# Patient Record
Sex: MALE | Race: OTHER | NOT HISPANIC OR LATINO | URBAN - METROPOLITAN AREA
[De-identification: names, ages, dates, MRNs, and addresses within clinical notes are randomized per-mention and may not be internally consistent; named-entity substitution may affect disease eponyms.]

---

## 2021-09-22 PROBLEM — E78.5 HYPERLIPIDEMIA, UNSPECIFIED: Chronic | Status: ACTIVE | Noted: 2020-02-28

## 2021-09-22 PROBLEM — I10 ESSENTIAL (PRIMARY) HYPERTENSION: Chronic | Status: ACTIVE | Noted: 2020-02-28

## 2021-09-23 VITALS
DIASTOLIC BLOOD PRESSURE: 79 MMHG | OXYGEN SATURATION: 98 % | HEART RATE: 78 BPM | RESPIRATION RATE: 18 BRPM | WEIGHT: 145.95 LBS | SYSTOLIC BLOOD PRESSURE: 147 MMHG | HEIGHT: 65 IN | TEMPERATURE: 98 F

## 2021-09-23 RX ORDER — CHLORHEXIDINE GLUCONATE 213 G/1000ML
1 SOLUTION TOPICAL ONCE
Refills: 0 | Status: DISCONTINUED | OUTPATIENT
Start: 2021-10-25 | End: 2021-11-09

## 2021-09-23 NOTE — H&P ADULT - ASSESSMENT
64 yo Turkish speaking Male, FHX CAD, PMHx HTN, HLD, Hep C s/p treatment, BPH who in light of pt's risk factors, CCS Class III anginal symptoms, and abnormal stress ECHO pt presents for recommended cardiac catheterization with possible intervention.    ASA II, Mallampati II    Hgb/HCT: 14.7/43.0. Pt denies bleeding, melena, BRBPR, hematuria. Pt reports compliance with Aspirin 81mg daily, last dose 10/24/21. Pt was loaded with Aspirin 243mg PO x 1 and Plavix 600mg PO x 1 prior to cath.   NS @ 75 cc/hr ordered. EF 65-70% by ECHO per MD note. Pt is euvolemic on exam. Cr. 1.20  K 3.7. KCl 40mEq PO x 1 ordered.     Pt is a candidate for moderate sedation: Yes    Pt was consented for procedure with Turkish  ID # [id number]  Risks & benefits of procedure and alternative therapy have been explained to the patient including but not limited to: allergic reaction, bleeding w/possible need for blood transfusion, infection, renal and vascular compromise, limb damage, arrhythmia, stroke, vessel dissection/perforation, Myocardial infarction, emergent CABG. Informed consent obtained and in chart.   66 yo Nepali speaking Male, FHX CAD, PMHx HTN, HLD, Hep C s/p treatment, BPH who in light of pt's risk factors, CCS Class III anginal symptoms, and abnormal stress ECHO pt presents for recommended cardiac catheterization with possible intervention.    ASA II, Mallampati II    Hgb/HCT: 14.7/43.0. Pt denies bleeding, melena, BRBPR, hematuria. Pt reports compliance with Aspirin 81mg daily, last dose 10/24/21. Pt was loaded with Aspirin 243mg PO x 1 and Plavix 600mg PO x 1 prior to cath.   NS @ 75 cc/hr ordered. EF 65-70% by ECHO per MD note. Pt is euvolemic on exam. Cr. 1.20  K 3.7. KCl 40mEq PO x 1 ordered.   - On Pravastatin 20mg at home.     Pt is a candidate for moderate sedation: Yes    Pt was consented for procedure with Nepali  ID # [id number]  Risks & benefits of procedure and alternative therapy have been explained to the patient including but not limited to: allergic reaction, bleeding w/possible need for blood transfusion, infection, renal and vascular compromise, limb damage, arrhythmia, stroke, vessel dissection/perforation, Myocardial infarction, emergent CABG. Informed consent obtained and in chart.

## 2021-09-23 NOTE — H&P ADULT - HISTORY OF PRESENT ILLNESS
[facility]    Cardiologist: Dr Rivero  Escort:  Pharmacy:  COVID:      66 yo Male, FHX CAD, PMHX Hyperlipidemia, Hep C presented to cardiologist, Dr Jessenia Rivero, endorsing left sided 5/10 nonradiating chest pain independent of activity, worsening over the last month. Denies.... According to MD note, Exercise NST 01/19 shows ischemia in mid-basal anterior walls. According to MD note, Echocardiogram showed EF 65-70%. Mild AI.     In light of pt's risk factors, CCS Class .... anginal symptoms, and abnormal Exercise NST pt presents for recommended cardiac catheterization with possible intervention.      Cardiologist: Dr Rivero  Escort:   Pharmacy:  COVID:      64 yo Male, FHX CAD, PMHX Hyperlipidemia, Hep C presented to cardiologist, Dr Jessenia Rivero, endorsing left sided 5/10 nonradiating chest pain independent of activity, worsening over the last month. Denies.... According to MD note, Exercise NST 01/19 shows ischemia in mid-basal anterior walls. According to MD note, Echocardiogram showed EF 65-70%. Mild AI.     In light of pt's risk factors, CCS Class .... anginal symptoms, and abnormal Exercise NST pt presents for recommended cardiac catheterization with possible intervention.      Cardiologist: Dr Rivero  Escort:   Pharmacy:  COVID:    *Please confirm meds on arrival  66 yo Male, FHX CAD, PMHX Hyperlipidemia, Hep C presented to cardiologist, Dr Jessenia Rivero, endorsing left sided 5/10 nonradiating chest pain independent of activity, worsening over the last month. Denies.... According to MD note, Exercise NST 01/19 shows ischemia in mid-basal anterior walls. According to MD note, Echocardiogram showed EF 65-70%. Mild AI.   In light of pt's risk factors, CCS Class .... anginal symptoms, and abnormal Exercise NST pt presents for recommended cardiac catheterization with possible intervention.     Cardiologist: Dr Rivero  Escort: Daughter   Pharmacy: [facility], [place]   COVID: 10/23- Neg in HIE     66 yo Khmer speaking Male, FHX CAD, PMHx HTN, HLD, Hep C s/p treatment, BPH who presented to cardiologist, Dr. Rivero, endorsing left sided 5/10 nonradiating chest pain independent of activity, worsening over the last month. Pt also endorsed left sided chest pain when walking uphill sometimes. Pt denies fever, chills, cough, palpitations. Per MD note: Stress ECHO 01/2019 shows ischemia in mid-basal anterior walls. Per MD note: Echocardiogram showed EF 65-70%. Mild AI. In light of pt's risk factors, CCS Class III anginal symptoms, and abnormal stress ECHO pt presents for recommended cardiac catheterization with possible intervention.

## 2021-09-23 NOTE — H&P ADULT - NSHPLABSRESULTS_GEN_ALL_CORE
ECG: NSR @ 86bpm, no STT changes                        14.7   10.44 )-----------( 316      ( 25 Oct 2021 15:34 )             43.0       10-25    140  |  104  |  19  ----------------------------<  102<H>  3.7   |  23  |  1.20    Ca    9.8      25 Oct 2021 15:34    TPro  8.6<H>  /  Alb  5.1<H>  /  TBili  0.5  /  DBili  x   /  AST  35  /  ALT  31  /  AlkPhos  43  10-25      PT/INR - ( 25 Oct 2021 15:34 )   PT: 11.8 sec;   INR: 0.98          PTT - ( 25 Oct 2021 15:34 )  PTT:35.2 sec    CARDIAC MARKERS ( 25 Oct 2021 15:34 )  x     / x     / 159 U/L / x     / 2.5 ng/mL

## 2021-10-25 ENCOUNTER — OUTPATIENT (OUTPATIENT)
Dept: OUTPATIENT SERVICES | Facility: HOSPITAL | Age: 66
LOS: 1 days | Discharge: HOME | End: 2021-10-25
Payer: COMMERCIAL

## 2021-10-25 LAB
A1C WITH ESTIMATED AVERAGE GLUCOSE RESULT: 5.5 % — SIGNIFICANT CHANGE UP (ref 4–5.6)
ALBUMIN SERPL ELPH-MCNC: 5.1 G/DL — HIGH (ref 3.3–5)
ALP SERPL-CCNC: 43 U/L — SIGNIFICANT CHANGE UP (ref 40–120)
ALT FLD-CCNC: 31 U/L — SIGNIFICANT CHANGE UP (ref 10–45)
ANION GAP SERPL CALC-SCNC: 13 MMOL/L — SIGNIFICANT CHANGE UP (ref 5–17)
APTT BLD: 35.2 SEC — SIGNIFICANT CHANGE UP (ref 27.5–35.5)
AST SERPL-CCNC: 35 U/L — SIGNIFICANT CHANGE UP (ref 10–40)
BASOPHILS # BLD AUTO: 0.14 K/UL — SIGNIFICANT CHANGE UP (ref 0–0.2)
BASOPHILS NFR BLD AUTO: 1.3 % — SIGNIFICANT CHANGE UP (ref 0–2)
BILIRUB SERPL-MCNC: 0.5 MG/DL — SIGNIFICANT CHANGE UP (ref 0.2–1.2)
BUN SERPL-MCNC: 19 MG/DL — SIGNIFICANT CHANGE UP (ref 7–23)
CALCIUM SERPL-MCNC: 9.8 MG/DL — SIGNIFICANT CHANGE UP (ref 8.4–10.5)
CHLORIDE SERPL-SCNC: 104 MMOL/L — SIGNIFICANT CHANGE UP (ref 96–108)
CHOLEST SERPL-MCNC: 200 MG/DL — HIGH
CK MB CFR SERPL CALC: 2.5 NG/ML — SIGNIFICANT CHANGE UP (ref 0–6.7)
CK SERPL-CCNC: 159 U/L — SIGNIFICANT CHANGE UP (ref 30–200)
CO2 SERPL-SCNC: 23 MMOL/L — SIGNIFICANT CHANGE UP (ref 22–31)
CREAT SERPL-MCNC: 1.2 MG/DL — SIGNIFICANT CHANGE UP (ref 0.5–1.3)
EOSINOPHIL # BLD AUTO: 0.26 K/UL — SIGNIFICANT CHANGE UP (ref 0–0.5)
EOSINOPHIL NFR BLD AUTO: 2.5 % — SIGNIFICANT CHANGE UP (ref 0–6)
ESTIMATED AVERAGE GLUCOSE: 111 MG/DL — SIGNIFICANT CHANGE UP (ref 68–114)
GLUCOSE SERPL-MCNC: 102 MG/DL — HIGH (ref 70–99)
HCT VFR BLD CALC: 43 % — SIGNIFICANT CHANGE UP (ref 39–50)
HDLC SERPL-MCNC: 56 MG/DL — SIGNIFICANT CHANGE UP
HGB BLD-MCNC: 14.7 G/DL — SIGNIFICANT CHANGE UP (ref 13–17)
IMM GRANULOCYTES NFR BLD AUTO: 0.4 % — SIGNIFICANT CHANGE UP (ref 0–1.5)
INR BLD: 0.98 — SIGNIFICANT CHANGE UP (ref 0.88–1.16)
LIPID PNL WITH DIRECT LDL SERPL: 122 MG/DL — HIGH
LYMPHOCYTES # BLD AUTO: 4.7 K/UL — HIGH (ref 1–3.3)
LYMPHOCYTES # BLD AUTO: 45 % — HIGH (ref 13–44)
MCHC RBC-ENTMCNC: 30.7 PG — SIGNIFICANT CHANGE UP (ref 27–34)
MCHC RBC-ENTMCNC: 34.2 GM/DL — SIGNIFICANT CHANGE UP (ref 32–36)
MCV RBC AUTO: 89.8 FL — SIGNIFICANT CHANGE UP (ref 80–100)
MONOCYTES # BLD AUTO: 0.77 K/UL — SIGNIFICANT CHANGE UP (ref 0–0.9)
MONOCYTES NFR BLD AUTO: 7.4 % — SIGNIFICANT CHANGE UP (ref 2–14)
NEUTROPHILS # BLD AUTO: 4.53 K/UL — SIGNIFICANT CHANGE UP (ref 1.8–7.4)
NEUTROPHILS NFR BLD AUTO: 43.4 % — SIGNIFICANT CHANGE UP (ref 43–77)
NON HDL CHOLESTEROL: 144 MG/DL — HIGH
NRBC # BLD: 0 /100 WBCS — SIGNIFICANT CHANGE UP (ref 0–0)
PLATELET # BLD AUTO: 316 K/UL — SIGNIFICANT CHANGE UP (ref 150–400)
POTASSIUM SERPL-MCNC: 3.7 MMOL/L — SIGNIFICANT CHANGE UP (ref 3.5–5.3)
POTASSIUM SERPL-SCNC: 3.7 MMOL/L — SIGNIFICANT CHANGE UP (ref 3.5–5.3)
PROT SERPL-MCNC: 8.6 G/DL — HIGH (ref 6–8.3)
PROTHROM AB SERPL-ACNC: 11.8 SEC — SIGNIFICANT CHANGE UP (ref 10.6–13.6)
RBC # BLD: 4.79 M/UL — SIGNIFICANT CHANGE UP (ref 4.2–5.8)
RBC # FLD: 13 % — SIGNIFICANT CHANGE UP (ref 10.3–14.5)
SODIUM SERPL-SCNC: 140 MMOL/L — SIGNIFICANT CHANGE UP (ref 135–145)
TRIGL SERPL-MCNC: 108 MG/DL — SIGNIFICANT CHANGE UP
WBC # BLD: 10.44 K/UL — SIGNIFICANT CHANGE UP (ref 3.8–10.5)
WBC # FLD AUTO: 10.44 K/UL — SIGNIFICANT CHANGE UP (ref 3.8–10.5)

## 2021-10-25 PROCEDURE — 80053 COMPREHEN METABOLIC PANEL: CPT

## 2021-10-25 PROCEDURE — 82553 CREATINE MB FRACTION: CPT

## 2021-10-25 PROCEDURE — 93005 ELECTROCARDIOGRAM TRACING: CPT

## 2021-10-25 PROCEDURE — 80061 LIPID PANEL: CPT

## 2021-10-25 PROCEDURE — 85730 THROMBOPLASTIN TIME PARTIAL: CPT

## 2021-10-25 PROCEDURE — 99152 MOD SED SAME PHYS/QHP 5/>YRS: CPT

## 2021-10-25 PROCEDURE — C1769: CPT

## 2021-10-25 PROCEDURE — 85610 PROTHROMBIN TIME: CPT

## 2021-10-25 PROCEDURE — 82550 ASSAY OF CK (CPK): CPT

## 2021-10-25 PROCEDURE — 85025 COMPLETE CBC W/AUTO DIFF WBC: CPT

## 2021-10-25 PROCEDURE — C1887: CPT

## 2021-10-25 PROCEDURE — 36415 COLL VENOUS BLD VENIPUNCTURE: CPT

## 2021-10-25 PROCEDURE — C1894: CPT

## 2021-10-25 PROCEDURE — 93010 ELECTROCARDIOGRAM REPORT: CPT

## 2021-10-25 PROCEDURE — 83036 HEMOGLOBIN GLYCOSYLATED A1C: CPT

## 2021-10-25 PROCEDURE — 93458 L HRT ARTERY/VENTRICLE ANGIO: CPT

## 2021-10-25 RX ORDER — SODIUM CHLORIDE 9 MG/ML
500 INJECTION INTRAMUSCULAR; INTRAVENOUS; SUBCUTANEOUS
Refills: 0 | Status: DISCONTINUED | OUTPATIENT
Start: 2021-10-25 | End: 2021-10-25

## 2021-10-25 RX ORDER — ASPIRIN/CALCIUM CARB/MAGNESIUM 324 MG
243 TABLET ORAL ONCE
Refills: 0 | Status: COMPLETED | OUTPATIENT
Start: 2021-10-25 | End: 2021-10-25

## 2021-10-25 RX ORDER — POTASSIUM CHLORIDE 20 MEQ
40 PACKET (EA) ORAL ONCE
Refills: 0 | Status: COMPLETED | OUTPATIENT
Start: 2021-10-25 | End: 2021-10-25

## 2021-10-25 RX ORDER — SODIUM CHLORIDE 9 MG/ML
500 INJECTION INTRAMUSCULAR; INTRAVENOUS; SUBCUTANEOUS
Refills: 0 | Status: DISCONTINUED | OUTPATIENT
Start: 2021-10-25 | End: 2021-11-09

## 2021-10-25 RX ORDER — CLOPIDOGREL BISULFATE 75 MG/1
600 TABLET, FILM COATED ORAL ONCE
Refills: 0 | Status: COMPLETED | OUTPATIENT
Start: 2021-10-25 | End: 2021-10-25

## 2021-10-25 RX ADMIN — SODIUM CHLORIDE 75 MILLILITER(S): 9 INJECTION INTRAMUSCULAR; INTRAVENOUS; SUBCUTANEOUS at 16:20

## 2021-10-25 RX ADMIN — Medication 40 MILLIEQUIVALENT(S): at 17:50

## 2021-10-25 RX ADMIN — CLOPIDOGREL BISULFATE 600 MILLIGRAM(S): 75 TABLET, FILM COATED ORAL at 16:18

## 2021-10-25 RX ADMIN — Medication 243 MILLIGRAM(S): at 16:18

## 2021-10-25 RX ADMIN — SODIUM CHLORIDE 75 MILLILITER(S): 9 INJECTION INTRAMUSCULAR; INTRAVENOUS; SUBCUTANEOUS at 17:37

## 2021-10-25 NOTE — PROGRESS NOTE ADULT - SUBJECTIVE AND OBJECTIVE BOX
Interventional Cardiology PA SDA Discharge Note    Patient without complaints. Ambulated and voided without difficulties    Afebrile, VSS    Ext: Right Radial : no hematoma, no bleeding, dressing; C/D/I    Pulses: intact RAD to baseline     A/P:  64 yo Greenlandic speaking Male, FHX CAD, PMHx HTN, HLD, Hep C s/p treatment, BPH who presented to cardiologist, Dr. Rivero, endorsing left sided 5/10 nonradiating chest pain independent of activity, worsening over the last month. Pt also endorsed left sided chest pain when walking uphill sometimes. Pt denies fever, chills, cough, palpitations. Per MD note: Stress ECHO 01/2019 shows ischemia in mid-basal anterior walls. Per MD note: Echocardiogram showed EF 65-70%. Mild AI. In light of pt's risk factors, CCS Class III anginal symptoms, and abnormal stress ECHO pt presents for recommended cardiac catheterization with possible intervention.    S/p diagnostic cardiac cath 10/25/21: distal LM 20%, ostial LAD 75%, mLAD 75%, apical LAD 70%, ostial RI 75%, LCx luminal irregularities, RCA luminal irregularities, EDP 2mmHg. Plan for MIDCAB followed by staged intervention of the ramus.       1.	Stable for discharge as per attending Dr. Rivero after bed rest, pt voids, groin/wrist stable and 30 minutes of ambulation.  2.	Follow-up with PMD/Cardiologist Dr. Rivero in 1-2 weeks as well as with CTS Dr. Hauser   3.	Discharged forms signed and copies in chart    Interventional Cardiology PA SDA Discharge Note    Patient without complaints. Ambulated and voided without difficulties    Afebrile, VSS    Ext: Right Radial : no hematoma, no bleeding, dressing; C/D/I    Pulses: intact RAD to baseline     A/P:  64 yo Bulgarian speaking Male, FHX CAD, PMHx HTN, HLD, Hep C s/p treatment, BPH who presented to cardiologist, Dr. Rivero, endorsing left sided 5/10 nonradiating chest pain independent of activity, worsening over the last month. Pt also endorsed left sided chest pain when walking uphill sometimes. Pt denies fever, chills, cough, palpitations. Per MD note: Stress ECHO 01/2019 shows ischemia in mid-basal anterior walls. Per MD note: Echocardiogram showed EF 65-70%. Mild AI. In light of pt's risk factors, CCS Class III anginal symptoms, and abnormal stress ECHO pt presents for recommended cardiac catheterization with possible intervention.    S/p diagnostic cardiac cath 10/25/21: distal LM 20%, ostial LAD 75%, mLAD 75%, apical LAD 70%, ostial RI 75%, LCx luminal irregularities, RCA luminal irregularities, EDP 2mmHg. Plan for MIDCAB followed by staged intervention of the ramus.       1.	Stable for discharge as per attending Dr. Rivero after bed rest, pt voids, groin/wrist stable and 30 minutes of ambulation.  2.	Follow-up with PMD/Cardiologist Dr. Rivero in 1-2 weeks as well as with CTS Dr. Hauser (CTS service made aware of pt and will forward info to outpatient NP's for appointment with Dr. Hauser)   3.	Discharged forms signed and copies in chart

## 2021-10-26 DIAGNOSIS — Z86.39 PERSONAL HISTORY OF OTHER ENDOCRINE, NUTRITIONAL AND METABOLIC DISEASE: ICD-10-CM

## 2021-10-26 DIAGNOSIS — Z87.438 PERSONAL HISTORY OF OTHER DISEASES OF MALE GENITAL ORGANS: ICD-10-CM

## 2021-10-26 DIAGNOSIS — Z86.19 PERSONAL HISTORY OF OTHER INFECTIOUS AND PARASITIC DISEASES: ICD-10-CM

## 2021-10-26 DIAGNOSIS — Z86.79 PERSONAL HISTORY OF OTHER DISEASES OF THE CIRCULATORY SYSTEM: ICD-10-CM

## 2021-10-26 PROBLEM — Z00.00 ENCOUNTER FOR PREVENTIVE HEALTH EXAMINATION: Status: ACTIVE | Noted: 2021-10-26

## 2021-10-26 RX ORDER — ASPIRIN 81 MG
81 TABLET, DELAYED RELEASE (ENTERIC COATED) ORAL DAILY
Qty: 30 | Refills: 5 | Status: ACTIVE | COMMUNITY

## 2021-10-28 ENCOUNTER — APPOINTMENT (OUTPATIENT)
Dept: CARDIOTHORACIC SURGERY | Facility: CLINIC | Age: 66
End: 2021-10-28
Payer: MEDICAID

## 2021-10-28 ENCOUNTER — OUTPATIENT (OUTPATIENT)
Dept: OUTPATIENT SERVICES | Facility: HOSPITAL | Age: 66
LOS: 1 days | End: 2021-10-28
Payer: COMMERCIAL

## 2021-10-28 VITALS
TEMPERATURE: 97.8 F | DIASTOLIC BLOOD PRESSURE: 79 MMHG | WEIGHT: 145 LBS | HEIGHT: 65 IN | BODY MASS INDEX: 24.16 KG/M2 | OXYGEN SATURATION: 98 % | SYSTOLIC BLOOD PRESSURE: 147 MMHG | RESPIRATION RATE: 16 BRPM | HEART RATE: 78 BPM

## 2021-10-28 DIAGNOSIS — R94.39 ABNORMAL RESULT OF OTHER CARDIOVASCULAR FUNCTION STUDY: ICD-10-CM

## 2021-10-28 DIAGNOSIS — I25.118 ATHEROSCLEROTIC HEART DISEASE OF NATIVE CORONARY ARTERY WITH OTHER FORMS OF ANGINA PECTORIS: ICD-10-CM

## 2021-10-28 DIAGNOSIS — Z01.818 ENCOUNTER FOR OTHER PREPROCEDURAL EXAMINATION: ICD-10-CM

## 2021-10-28 DIAGNOSIS — Z87.891 PERSONAL HISTORY OF NICOTINE DEPENDENCE: ICD-10-CM

## 2021-10-28 PROBLEM — B19.20 UNSPECIFIED VIRAL HEPATITIS C WITHOUT HEPATIC COMA: Chronic | Status: ACTIVE | Noted: 2021-10-25

## 2021-10-28 PROBLEM — Z87.438 PERSONAL HISTORY OF OTHER DISEASES OF MALE GENITAL ORGANS: Chronic | Status: ACTIVE | Noted: 2021-10-25

## 2021-10-28 LAB
A1C WITH ESTIMATED AVERAGE GLUCOSE RESULT: 5.4 % — SIGNIFICANT CHANGE UP (ref 4–5.6)
ALBUMIN SERPL ELPH-MCNC: 5.1 G/DL — HIGH (ref 3.3–5)
ALP SERPL-CCNC: 44 U/L — SIGNIFICANT CHANGE UP (ref 40–120)
ALT FLD-CCNC: 35 U/L — SIGNIFICANT CHANGE UP (ref 10–45)
ANION GAP SERPL CALC-SCNC: 12 MMOL/L — SIGNIFICANT CHANGE UP (ref 5–17)
APPEARANCE UR: CLEAR — SIGNIFICANT CHANGE UP
APTT BLD: 34.9 SEC — SIGNIFICANT CHANGE UP (ref 27.5–35.5)
AST SERPL-CCNC: 31 U/L — SIGNIFICANT CHANGE UP (ref 10–40)
BACTERIA # UR AUTO: PRESENT /HPF
BASOPHILS # BLD AUTO: 0.12 K/UL — SIGNIFICANT CHANGE UP (ref 0–0.2)
BASOPHILS NFR BLD AUTO: 1.5 % — SIGNIFICANT CHANGE UP (ref 0–2)
BILIRUB SERPL-MCNC: 0.5 MG/DL — SIGNIFICANT CHANGE UP (ref 0.2–1.2)
BILIRUB UR-MCNC: NEGATIVE — SIGNIFICANT CHANGE UP
BLD GP AB SCN SERPL QL: NEGATIVE — SIGNIFICANT CHANGE UP
BUN SERPL-MCNC: 25 MG/DL — HIGH (ref 7–23)
CALCIUM SERPL-MCNC: 10.4 MG/DL — SIGNIFICANT CHANGE UP (ref 8.4–10.5)
CHLORIDE SERPL-SCNC: 102 MMOL/L — SIGNIFICANT CHANGE UP (ref 96–108)
CHOLEST SERPL-MCNC: 212 MG/DL — HIGH
CO2 SERPL-SCNC: 27 MMOL/L — SIGNIFICANT CHANGE UP (ref 22–31)
COLOR SPEC: YELLOW — SIGNIFICANT CHANGE UP
COMMENT - URINE: SIGNIFICANT CHANGE UP
CREAT SERPL-MCNC: 1.25 MG/DL — SIGNIFICANT CHANGE UP (ref 0.5–1.3)
DIFF PNL FLD: ABNORMAL
EOSINOPHIL # BLD AUTO: 0.2 K/UL — SIGNIFICANT CHANGE UP (ref 0–0.5)
EOSINOPHIL NFR BLD AUTO: 2.4 % — SIGNIFICANT CHANGE UP (ref 0–6)
EPI CELLS # UR: SIGNIFICANT CHANGE UP /HPF (ref 0–5)
ESTIMATED AVERAGE GLUCOSE: 108 MG/DL — SIGNIFICANT CHANGE UP (ref 68–114)
GLUCOSE SERPL-MCNC: 93 MG/DL — SIGNIFICANT CHANGE UP (ref 70–99)
GLUCOSE UR QL: NEGATIVE — SIGNIFICANT CHANGE UP
HBV SURFACE AG SER-ACNC: SIGNIFICANT CHANGE UP
HCT VFR BLD CALC: 45.7 % — SIGNIFICANT CHANGE UP (ref 39–50)
HDLC SERPL-MCNC: 57 MG/DL — SIGNIFICANT CHANGE UP
HGB BLD-MCNC: 15.6 G/DL — SIGNIFICANT CHANGE UP (ref 13–17)
IMM GRANULOCYTES NFR BLD AUTO: 0.4 % — SIGNIFICANT CHANGE UP (ref 0–1.5)
INR BLD: 0.96 — SIGNIFICANT CHANGE UP (ref 0.88–1.16)
KETONES UR-MCNC: NEGATIVE — SIGNIFICANT CHANGE UP
LEUKOCYTE ESTERASE UR-ACNC: NEGATIVE — SIGNIFICANT CHANGE UP
LIPID PNL WITH DIRECT LDL SERPL: 128 MG/DL — HIGH
LYMPHOCYTES # BLD AUTO: 3.02 K/UL — SIGNIFICANT CHANGE UP (ref 1–3.3)
LYMPHOCYTES # BLD AUTO: 36.7 % — SIGNIFICANT CHANGE UP (ref 13–44)
MCHC RBC-ENTMCNC: 31.4 PG — SIGNIFICANT CHANGE UP (ref 27–34)
MCHC RBC-ENTMCNC: 34.1 GM/DL — SIGNIFICANT CHANGE UP (ref 32–36)
MCV RBC AUTO: 92 FL — SIGNIFICANT CHANGE UP (ref 80–100)
MONOCYTES # BLD AUTO: 0.63 K/UL — SIGNIFICANT CHANGE UP (ref 0–0.9)
MONOCYTES NFR BLD AUTO: 7.7 % — SIGNIFICANT CHANGE UP (ref 2–14)
NEUTROPHILS # BLD AUTO: 4.23 K/UL — SIGNIFICANT CHANGE UP (ref 1.8–7.4)
NEUTROPHILS NFR BLD AUTO: 51.3 % — SIGNIFICANT CHANGE UP (ref 43–77)
NITRITE UR-MCNC: NEGATIVE — SIGNIFICANT CHANGE UP
NON HDL CHOLESTEROL: 155 MG/DL — HIGH
NRBC # BLD: 0 /100 WBCS — SIGNIFICANT CHANGE UP (ref 0–0)
PH UR: 5 — SIGNIFICANT CHANGE UP (ref 5–8)
PLATELET # BLD AUTO: 328 K/UL — SIGNIFICANT CHANGE UP (ref 150–400)
POTASSIUM SERPL-MCNC: 4.9 MMOL/L — SIGNIFICANT CHANGE UP (ref 3.5–5.3)
POTASSIUM SERPL-SCNC: 4.9 MMOL/L — SIGNIFICANT CHANGE UP (ref 3.5–5.3)
PROT SERPL-MCNC: 8.4 G/DL — HIGH (ref 6–8.3)
PROT UR-MCNC: NEGATIVE MG/DL — SIGNIFICANT CHANGE UP
PROTHROM AB SERPL-ACNC: 11.5 SEC — SIGNIFICANT CHANGE UP (ref 10.6–13.6)
RBC # BLD: 4.97 M/UL — SIGNIFICANT CHANGE UP (ref 4.2–5.8)
RBC # FLD: 13.2 % — SIGNIFICANT CHANGE UP (ref 10.3–14.5)
RBC CASTS # UR COMP ASSIST: < 5 /HPF — SIGNIFICANT CHANGE UP
RH IG SCN BLD-IMP: POSITIVE — SIGNIFICANT CHANGE UP
SODIUM SERPL-SCNC: 141 MMOL/L — SIGNIFICANT CHANGE UP (ref 135–145)
SP GR SPEC: >=1.03 — SIGNIFICANT CHANGE UP (ref 1–1.03)
TRIGL SERPL-MCNC: 136 MG/DL — SIGNIFICANT CHANGE UP
TSH SERPL-MCNC: 1.73 UIU/ML — SIGNIFICANT CHANGE UP (ref 0.27–4.2)
UROBILINOGEN FLD QL: 0.2 E.U./DL — SIGNIFICANT CHANGE UP
WBC # BLD: 8.23 K/UL — SIGNIFICANT CHANGE UP (ref 3.8–10.5)
WBC # FLD AUTO: 8.23 K/UL — SIGNIFICANT CHANGE UP (ref 3.8–10.5)
WBC UR QL: < 5 /HPF — SIGNIFICANT CHANGE UP

## 2021-10-28 PROCEDURE — 99204 OFFICE O/P NEW MOD 45 MIN: CPT

## 2021-10-28 PROCEDURE — 71046 X-RAY EXAM CHEST 2 VIEWS: CPT

## 2021-10-28 PROCEDURE — 83036 HEMOGLOBIN GLYCOSYLATED A1C: CPT

## 2021-10-28 PROCEDURE — 81001 URINALYSIS AUTO W/SCOPE: CPT

## 2021-10-28 PROCEDURE — 87340 HEPATITIS B SURFACE AG IA: CPT

## 2021-10-28 PROCEDURE — 85025 COMPLETE CBC W/AUTO DIFF WBC: CPT

## 2021-10-28 PROCEDURE — 71046 X-RAY EXAM CHEST 2 VIEWS: CPT | Mod: 26

## 2021-10-28 PROCEDURE — 85730 THROMBOPLASTIN TIME PARTIAL: CPT

## 2021-10-28 PROCEDURE — 86901 BLOOD TYPING SEROLOGIC RH(D): CPT

## 2021-10-28 PROCEDURE — 85610 PROTHROMBIN TIME: CPT

## 2021-10-28 PROCEDURE — 86850 RBC ANTIBODY SCREEN: CPT

## 2021-10-28 PROCEDURE — 80053 COMPREHEN METABOLIC PANEL: CPT

## 2021-10-28 PROCEDURE — 80061 LIPID PANEL: CPT

## 2021-10-28 PROCEDURE — 84443 ASSAY THYROID STIM HORMONE: CPT

## 2021-10-28 PROCEDURE — 86900 BLOOD TYPING SEROLOGIC ABO: CPT

## 2021-10-28 RX ORDER — PRAVASTATIN SODIUM 20 MG/1
20 TABLET ORAL
Qty: 30 | Refills: 0 | Status: COMPLETED | COMMUNITY
End: 2021-10-28

## 2021-10-28 RX ORDER — MECLIZINE HYDROCHLORIDE 25 MG/1
25 TABLET ORAL 3 TIMES DAILY
Qty: 90 | Refills: 0 | Status: COMPLETED | COMMUNITY
End: 2021-10-28

## 2021-10-28 RX ORDER — ATORVASTATIN CALCIUM 20 MG/1
20 TABLET, FILM COATED ORAL DAILY
Refills: 0 | Status: ACTIVE | COMMUNITY

## 2021-10-28 RX ORDER — TAMSULOSIN HYDROCHLORIDE 0.4 MG/1
0.4 CAPSULE ORAL
Qty: 30 | Refills: 2 | Status: COMPLETED | COMMUNITY
End: 2021-10-28

## 2021-10-28 RX ORDER — FOLIC ACID 1 MG/1
1 TABLET ORAL DAILY
Refills: 0 | Status: ACTIVE | COMMUNITY

## 2021-10-28 RX ORDER — CYCLOBENZAPRINE HYDROCHLORIDE 10 MG/1
10 TABLET, FILM COATED ORAL DAILY
Refills: 0 | Status: ACTIVE | COMMUNITY

## 2021-10-28 NOTE — ASSESSMENT
[FreeTextEntry1] : 66 yo Chinese speaking Male, former smoker presents with  PMHx HTN, HLD, Hep C, BPH and class III angina with severe 2 vessel CAD.  Dr. Hauser reviewed the cardiac cath imaging and reports with the patient and his daughter and discussed the case with Dr. Rivero. Dr. Hauser discussed the risks, benefits and alternatives to surgery. Risks include but not limited to death, heart attack, bleeding, stroke, kidney problems and infection. He quoted a 1-2% operative mortality and complication risk. He also discussed the various approaches, minimally invasive versus sternotomy. Dr. Hauser feels the patient will benefit and is a candidate for a Robotic assisted MIDCAB as part of hybrid procedure. All questions were addressed and patient agrees to proceed with surgery.\par \par Plan: \par PST--labs, UA -patient sent to lab, results reviewed in [place]\par Chest x-ray PA/Lat performed today\par Patient will need covid test 72 hrs prior to admission- SDA\par Patient advised to see Dr. Piedad Harris first prior to scheduling surgery.\par Antibacterial soaps provided to patient\par **PCI of Ramus to be performed by Dr. Rivero on same admission\par \par

## 2021-10-28 NOTE — END OF VISIT
[Time Spent: ___ minutes] : I have spent [unfilled] minutes of time on the encounter. [FreeTextEntry3] : I, CHRISTAL MALHOTRA , am scribing for and in the presence of DIA CAGE the following sections: History of present illness, past Medical/family/surgical/family/social history, review of systems, vital signs, physical exam and disposition.\par  I personally performed the services described in the documentation, reviewed the documentation recorded by the scribe in my presence and it accurately and completely records my words and actions.\par

## 2021-10-28 NOTE — CONSULT LETTER
[Dear  ___] : Dear  [unfilled], [Please see my note below.] : Please see my note below. [Sincerely,] : Sincerely, [DrRadha  ___] : Dr. ALVAREZ [FreeTextEntry2] : Pravin Rivero M.D. [FreeTextEntry1] : Please find attached our consultation on your patient, TOMI GARCIA \par We take a multidisciplinary team approach to patient care and consider you, the referring physician, an extension of our team. We will maintain an open line of communication with you throughout your patient's treatment course.  \par It is our commitment to provide your patient with the highest quality of advanced therapeutic options. We thank you for allowing us to participate in the care of your patient.\par Please do not hesitate to contact our team with any questions or concerns at [phone].\par  [FreeTextEntry3] : Vincent Hauser MD, FRCS\par \par [facility] \par Department of Cardiothoracic  Surgery \par Director of Robotic Cardiac Surgery\par Professor of Cardiovascular & Thoracic Surgery\par [employer] \par \par [facility]\par

## 2021-10-28 NOTE — REVIEW OF SYSTEMS
[Chest Pain] : chest pain [SOB on Exertion] : shortness of breath during exertion [As Noted in HPI] : as noted in HPI [Negative] : Heme/Lymph [Lower Ext Edema] : no extremity edema

## 2021-10-28 NOTE — HISTORY OF PRESENT ILLNESS
[FreeTextEntry1] : 65 year old Bulgarian speaking Male, former smoker (20 yrs, 1/4 ppd, quit 2012) with PMHx of HTN, HLD, Hep C s/p treatment and BPH who presented to cardiologist, Dr. Rivero, endorsing left sided 5/10 nonradiating chest pain independent of activity, worsening over the last month. CCS III. Pt also endorsed left sided chest pain when walking uphill sometimes. Pt denies fever, chills, cough, palpitations.  Stress ECHO 01/2019 shows ischemia in mid-basal anterior walls. Per MD note: Echocardiogram showed EF 65-70%. Mild AI. 10/25/21 s/p Cardiac cath that revealed 2 vessel CAD, LAD and Ramus. \par \par He presents today for surgical consult with Dr. Hauser accompanied by his daughter Pam. Patient states that he continues to have chest pain and dyspnea on exertion when climbing stairs and walking up inclines. He also reports radiating right leg pain and weakness that started several weeks ago. He underwent an US which r/o dvt which was negative. He has sustained several falls due to the the weakness. He currently uses a walker and cane in the home for additional support. 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

## 2021-10-28 NOTE — DATA REVIEWED
[FreeTextEntry1] : 10/25/21 Cardiac cath:  distal LM 20%, ostial LAD 75%, mLAD 75%, apical LAD 70%, ostial RI 75%, LCx luminal irregularities, RCA luminal irregularities, EDP 2mmHg.

## 2021-12-06 ENCOUNTER — APPOINTMENT (OUTPATIENT)
Dept: CARDIOTHORACIC SURGERY | Facility: CLINIC | Age: 66
End: 2021-12-06

## 2021-12-09 ENCOUNTER — APPOINTMENT (OUTPATIENT)
Dept: CARDIOTHORACIC SURGERY | Facility: HOSPITAL | Age: 66
End: 2021-12-09

## 2021-12-15 ENCOUNTER — NON-APPOINTMENT (OUTPATIENT)
Age: 66
End: 2021-12-15

## 2022-02-21 ENCOUNTER — NON-APPOINTMENT (OUTPATIENT)
Age: 67
End: 2022-02-21

## 2022-02-22 ENCOUNTER — APPOINTMENT (OUTPATIENT)
Dept: CARDIOTHORACIC SURGERY | Facility: CLINIC | Age: 67
End: 2022-02-22

## 2022-03-01 ENCOUNTER — APPOINTMENT (OUTPATIENT)
Dept: CARDIOTHORACIC SURGERY | Facility: CLINIC | Age: 67
End: 2022-03-01

## 2022-03-09 ENCOUNTER — TRANSCRIPTION ENCOUNTER (OUTPATIENT)
Age: 67
End: 2022-03-09

## 2022-03-09 ENCOUNTER — INPATIENT (INPATIENT)
Facility: HOSPITAL | Age: 67
LOS: 5 days | Discharge: HOME HEALTH | DRG: 232 | End: 2022-03-15
Attending: THORACIC SURGERY (CARDIOTHORACIC VASCULAR SURGERY) | Admitting: THORACIC SURGERY (CARDIOTHORACIC VASCULAR SURGERY)
Payer: MEDICAID

## 2022-03-09 VITALS
SYSTOLIC BLOOD PRESSURE: 155 MMHG | HEIGHT: 65 IN | DIASTOLIC BLOOD PRESSURE: 83 MMHG | TEMPERATURE: 98 F | OXYGEN SATURATION: 99 % | WEIGHT: 145.95 LBS | HEART RATE: 73 BPM | RESPIRATION RATE: 17 BRPM

## 2022-03-09 LAB
A1C WITH ESTIMATED AVERAGE GLUCOSE RESULT: 5.6 % — SIGNIFICANT CHANGE UP (ref 4–5.6)
ALBUMIN SERPL ELPH-MCNC: 4.2 G/DL — SIGNIFICANT CHANGE UP (ref 3.3–5)
ALP SERPL-CCNC: 41 U/L — SIGNIFICANT CHANGE UP (ref 40–120)
ALT FLD-CCNC: 73 U/L — HIGH (ref 10–45)
ANION GAP SERPL CALC-SCNC: 10 MMOL/L — SIGNIFICANT CHANGE UP (ref 5–17)
APPEARANCE UR: CLEAR — SIGNIFICANT CHANGE UP
APTT BLD: 31.8 SEC — SIGNIFICANT CHANGE UP (ref 27.5–35.5)
AST SERPL-CCNC: 48 U/L — HIGH (ref 10–40)
BASOPHILS # BLD AUTO: 0.11 K/UL — SIGNIFICANT CHANGE UP (ref 0–0.2)
BASOPHILS NFR BLD AUTO: 1.4 % — SIGNIFICANT CHANGE UP (ref 0–2)
BILIRUB SERPL-MCNC: 0.4 MG/DL — SIGNIFICANT CHANGE UP (ref 0.2–1.2)
BILIRUB UR-MCNC: NEGATIVE — SIGNIFICANT CHANGE UP
BLD GP AB SCN SERPL QL: NEGATIVE — SIGNIFICANT CHANGE UP
BUN SERPL-MCNC: 21 MG/DL — SIGNIFICANT CHANGE UP (ref 7–23)
CALCIUM SERPL-MCNC: 9.6 MG/DL — SIGNIFICANT CHANGE UP (ref 8.4–10.5)
CHLORIDE SERPL-SCNC: 103 MMOL/L — SIGNIFICANT CHANGE UP (ref 96–108)
CHOLEST SERPL-MCNC: 163 MG/DL — SIGNIFICANT CHANGE UP
CK MB CFR SERPL CALC: 2.2 NG/ML — SIGNIFICANT CHANGE UP (ref 0–6.7)
CK SERPL-CCNC: 159 U/L — SIGNIFICANT CHANGE UP (ref 30–200)
CO2 SERPL-SCNC: 23 MMOL/L — SIGNIFICANT CHANGE UP (ref 22–31)
COLOR SPEC: YELLOW — SIGNIFICANT CHANGE UP
CREAT SERPL-MCNC: 1.26 MG/DL — SIGNIFICANT CHANGE UP (ref 0.5–1.3)
DIFF PNL FLD: NEGATIVE — SIGNIFICANT CHANGE UP
EGFR: 63 ML/MIN/1.73M2 — SIGNIFICANT CHANGE UP
EOSINOPHIL # BLD AUTO: 0.21 K/UL — SIGNIFICANT CHANGE UP (ref 0–0.5)
EOSINOPHIL NFR BLD AUTO: 2.7 % — SIGNIFICANT CHANGE UP (ref 0–6)
ESTIMATED AVERAGE GLUCOSE: 114 MG/DL — SIGNIFICANT CHANGE UP (ref 68–114)
GLUCOSE SERPL-MCNC: 97 MG/DL — SIGNIFICANT CHANGE UP (ref 70–99)
GLUCOSE UR QL: NEGATIVE — SIGNIFICANT CHANGE UP
HCT VFR BLD CALC: 41 % — SIGNIFICANT CHANGE UP (ref 39–50)
HDLC SERPL-MCNC: 50 MG/DL — SIGNIFICANT CHANGE UP
HGB BLD-MCNC: 13.7 G/DL — SIGNIFICANT CHANGE UP (ref 13–17)
IMM GRANULOCYTES NFR BLD AUTO: 0.4 % — SIGNIFICANT CHANGE UP (ref 0–1.5)
INR BLD: 0.98 — SIGNIFICANT CHANGE UP (ref 0.88–1.16)
KETONES UR-MCNC: NEGATIVE — SIGNIFICANT CHANGE UP
LEUKOCYTE ESTERASE UR-ACNC: NEGATIVE — SIGNIFICANT CHANGE UP
LIPID PNL WITH DIRECT LDL SERPL: 72 MG/DL — SIGNIFICANT CHANGE UP
LYMPHOCYTES # BLD AUTO: 2.17 K/UL — SIGNIFICANT CHANGE UP (ref 1–3.3)
LYMPHOCYTES # BLD AUTO: 28.3 % — SIGNIFICANT CHANGE UP (ref 13–44)
MAGNESIUM SERPL-MCNC: 1.9 MG/DL — SIGNIFICANT CHANGE UP (ref 1.6–2.6)
MCHC RBC-ENTMCNC: 30.6 PG — SIGNIFICANT CHANGE UP (ref 27–34)
MCHC RBC-ENTMCNC: 33.4 GM/DL — SIGNIFICANT CHANGE UP (ref 32–36)
MCV RBC AUTO: 91.7 FL — SIGNIFICANT CHANGE UP (ref 80–100)
MONOCYTES # BLD AUTO: 0.68 K/UL — SIGNIFICANT CHANGE UP (ref 0–0.9)
MONOCYTES NFR BLD AUTO: 8.9 % — SIGNIFICANT CHANGE UP (ref 2–14)
NEUTROPHILS # BLD AUTO: 4.46 K/UL — SIGNIFICANT CHANGE UP (ref 1.8–7.4)
NEUTROPHILS NFR BLD AUTO: 58.3 % — SIGNIFICANT CHANGE UP (ref 43–77)
NITRITE UR-MCNC: NEGATIVE — SIGNIFICANT CHANGE UP
NON HDL CHOLESTEROL: 113 MG/DL — SIGNIFICANT CHANGE UP
NRBC # BLD: 0 /100 WBCS — SIGNIFICANT CHANGE UP (ref 0–0)
NT-PROBNP SERPL-SCNC: 59 PG/ML — SIGNIFICANT CHANGE UP (ref 0–300)
PH UR: 6 — SIGNIFICANT CHANGE UP (ref 5–8)
PLATELET # BLD AUTO: 275 K/UL — SIGNIFICANT CHANGE UP (ref 150–400)
POTASSIUM SERPL-MCNC: 3.6 MMOL/L — SIGNIFICANT CHANGE UP (ref 3.5–5.3)
POTASSIUM SERPL-SCNC: 3.6 MMOL/L — SIGNIFICANT CHANGE UP (ref 3.5–5.3)
PROT SERPL-MCNC: 7.2 G/DL — SIGNIFICANT CHANGE UP (ref 6–8.3)
PROT UR-MCNC: NEGATIVE MG/DL — SIGNIFICANT CHANGE UP
PROTHROM AB SERPL-ACNC: 11.7 SEC — SIGNIFICANT CHANGE UP (ref 10.5–13.4)
RBC # BLD: 4.47 M/UL — SIGNIFICANT CHANGE UP (ref 4.2–5.8)
RBC # FLD: 13 % — SIGNIFICANT CHANGE UP (ref 10.3–14.5)
RH IG SCN BLD-IMP: POSITIVE — SIGNIFICANT CHANGE UP
SODIUM SERPL-SCNC: 136 MMOL/L — SIGNIFICANT CHANGE UP (ref 135–145)
SP GR SPEC: >=1.03 — SIGNIFICANT CHANGE UP (ref 1–1.03)
T4 AB SER-ACNC: 7.03 UG/DL — SIGNIFICANT CHANGE UP (ref 4.5–11.7)
TRIGL SERPL-MCNC: 205 MG/DL — HIGH
TROPONIN T SERPL-MCNC: 0.01 NG/ML — SIGNIFICANT CHANGE UP (ref 0–0.01)
TSH SERPL-MCNC: 0.94 UIU/ML — SIGNIFICANT CHANGE UP (ref 0.27–4.2)
UROBILINOGEN FLD QL: 0.2 E.U./DL — SIGNIFICANT CHANGE UP
WBC # BLD: 7.66 K/UL — SIGNIFICANT CHANGE UP (ref 3.8–10.5)
WBC # FLD AUTO: 7.66 K/UL — SIGNIFICANT CHANGE UP (ref 3.8–10.5)

## 2022-03-09 PROCEDURE — 93880 EXTRACRANIAL BILAT STUDY: CPT | Mod: 26

## 2022-03-09 PROCEDURE — 71046 X-RAY EXAM CHEST 2 VIEWS: CPT | Mod: 26

## 2022-03-09 PROCEDURE — 99285 EMERGENCY DEPT VISIT HI MDM: CPT

## 2022-03-09 PROCEDURE — 94010 BREATHING CAPACITY TEST: CPT | Mod: 26

## 2022-03-09 PROCEDURE — 93010 ELECTROCARDIOGRAM REPORT: CPT

## 2022-03-09 RX ORDER — FENOFIBRATE,MICRONIZED 130 MG
145 CAPSULE ORAL DAILY
Refills: 0 | Status: DISCONTINUED | OUTPATIENT
Start: 2022-03-09 | End: 2022-03-15

## 2022-03-09 RX ORDER — CHLORHEXIDINE GLUCONATE 213 G/1000ML
1 SOLUTION TOPICAL ONCE
Refills: 0 | Status: COMPLETED | OUTPATIENT
Start: 2022-03-09 | End: 2022-03-09

## 2022-03-09 RX ORDER — CHLORHEXIDINE GLUCONATE 213 G/1000ML
5 SOLUTION TOPICAL ONCE
Refills: 0 | Status: COMPLETED | OUTPATIENT
Start: 2022-03-09 | End: 2022-03-10

## 2022-03-09 RX ORDER — NITROGLYCERIN 6.5 MG
0.4 CAPSULE, EXTENDED RELEASE ORAL
Refills: 0 | Status: DISCONTINUED | OUTPATIENT
Start: 2022-03-09 | End: 2022-03-10

## 2022-03-09 RX ORDER — HEPARIN SODIUM 5000 [USP'U]/ML
5000 INJECTION INTRAVENOUS; SUBCUTANEOUS EVERY 8 HOURS
Refills: 0 | Status: DISCONTINUED | OUTPATIENT
Start: 2022-03-09 | End: 2022-03-11

## 2022-03-09 RX ORDER — ATORVASTATIN CALCIUM 80 MG/1
20 TABLET, FILM COATED ORAL AT BEDTIME
Refills: 0 | Status: DISCONTINUED | OUTPATIENT
Start: 2022-03-09 | End: 2022-03-15

## 2022-03-09 RX ORDER — METOPROLOL TARTRATE 50 MG
12.5 TABLET ORAL EVERY 12 HOURS
Refills: 0 | Status: DISCONTINUED | OUTPATIENT
Start: 2022-03-09 | End: 2022-03-10

## 2022-03-09 RX ORDER — CHLORHEXIDINE GLUCONATE 213 G/1000ML
1 SOLUTION TOPICAL ONCE
Refills: 0 | Status: COMPLETED | OUTPATIENT
Start: 2022-03-10 | End: 2022-03-10

## 2022-03-09 RX ORDER — ASPIRIN/CALCIUM CARB/MAGNESIUM 324 MG
81 TABLET ORAL DAILY
Refills: 0 | Status: DISCONTINUED | OUTPATIENT
Start: 2022-03-09 | End: 2022-03-15

## 2022-03-09 RX ORDER — CYCLOBENZAPRINE HYDROCHLORIDE 10 MG/1
1 TABLET, FILM COATED ORAL
Qty: 0 | Refills: 0 | DISCHARGE

## 2022-03-09 RX ORDER — FOLIC ACID 0.8 MG
1 TABLET ORAL DAILY
Refills: 0 | Status: DISCONTINUED | OUTPATIENT
Start: 2022-03-09 | End: 2022-03-15

## 2022-03-09 RX ORDER — PANTOPRAZOLE SODIUM 20 MG/1
40 TABLET, DELAYED RELEASE ORAL
Refills: 0 | Status: DISCONTINUED | OUTPATIENT
Start: 2022-03-09 | End: 2022-03-10

## 2022-03-09 RX ORDER — SODIUM CHLORIDE 9 MG/ML
3 INJECTION INTRAMUSCULAR; INTRAVENOUS; SUBCUTANEOUS EVERY 8 HOURS
Refills: 0 | Status: DISCONTINUED | OUTPATIENT
Start: 2022-03-09 | End: 2022-03-10

## 2022-03-09 RX ADMIN — CHLORHEXIDINE GLUCONATE 1 APPLICATION(S): 213 SOLUTION TOPICAL at 22:31

## 2022-03-09 RX ADMIN — Medication 145 MILLIGRAM(S): at 23:10

## 2022-03-09 RX ADMIN — SODIUM CHLORIDE 3 MILLILITER(S): 9 INJECTION INTRAMUSCULAR; INTRAVENOUS; SUBCUTANEOUS at 14:38

## 2022-03-09 RX ADMIN — ATORVASTATIN CALCIUM 20 MILLIGRAM(S): 80 TABLET, FILM COATED ORAL at 22:30

## 2022-03-09 RX ADMIN — Medication 81 MILLIGRAM(S): at 22:31

## 2022-03-09 RX ADMIN — HEPARIN SODIUM 5000 UNIT(S): 5000 INJECTION INTRAVENOUS; SUBCUTANEOUS at 22:31

## 2022-03-09 RX ADMIN — Medication 0.4 MILLIGRAM(S): at 13:36

## 2022-03-09 RX ADMIN — HEPARIN SODIUM 5000 UNIT(S): 5000 INJECTION INTRAVENOUS; SUBCUTANEOUS at 14:34

## 2022-03-09 RX ADMIN — Medication 1 MILLIGRAM(S): at 22:31

## 2022-03-09 RX ADMIN — SODIUM CHLORIDE 3 MILLILITER(S): 9 INJECTION INTRAMUSCULAR; INTRAVENOUS; SUBCUTANEOUS at 21:44

## 2022-03-09 NOTE — ED ADULT TRIAGE NOTE - CHIEF COMPLAINT QUOTE
Patient c/o left sided chest pain that began this morning, associated with shortness of breath.  Hx 1 cardiac stent placed a few months ago, patient reporting the pain is similar to the pain he felt when he needed a stent.  STAT EKG in progress.

## 2022-03-09 NOTE — ED PROVIDER NOTE - OBJECTIVE STATEMENT
The patient is a 66y Male with a PMH of CAD p/w worsening chest pain that started today. The chest pain feels like a burning sensation and is worse on exertion. He also has shortness of breath. He states that he took baby aspirin this morning with no relief. He denies dizziness, palpitations, swelling in the extremities, orthopnea. He has also has a history of one cardiac stent. The patient is a 66y Male with a PMH of CAD s/p stent p/w worsening chest pain that started today. The chest pain feels like a burning sensation and is worse on exertion. He also has shortness of breath. He states that he took baby aspirin this morning with no relief. He denies dizziness, palpitations, swelling in the extremities, orthopnea.

## 2022-03-09 NOTE — H&P ADULT - NSHPREVIEWOFSYSTEMS_GEN_ALL_CORE
Review of Systems  CONSTITUTIONAL:  Denies Fevers / chills, sweats, fatigue, weight loss, weight gain                                      NEURO:  Denies parathesias, seizures, syncope, confusion                                                                                EYES:  Denies Blurry vision, discharge, pain, loss of vision                                                                                    ENMT:  Denies Difficulty hearing, vertigo, dysphagia, epistaxis, recent dental work                                       CV:  + Chest pain and GARCIA. Denies palpitations, orthopnea                                                                                          RESPIRATORY:  Denies Wheezing, SOB, cough / sputum, hemoptysis                                                                GI:  Denies Nausea, vomiting, diarrhea, constipation, melena, difficulty swallowing                                               : Denies Hematuria, dysuria, urgency, incontinence                                                                                         MUSKULOSKELETAL:  Denies arthritis, joint swelling, muscle weakness                                                             SKIN/BREAST:  Denies rash, itching, hair loss, masses                                                                                            PSYCH:  Denies depression, anxiety, suicidal ideation                                                                                               HEME/LYMPH:  Denies bruises easily, enlarged lymph nodes, tender lymph nodes                                        ENDOCRINE:  Denies cold intolerance, heat intolerance, polydipsia

## 2022-03-09 NOTE — H&P ADULT - HISTORY OF PRESENT ILLNESS
66 year old male, former smoker, with PMHx of HTN, HLD, Hep C, BPH, COVID 2021, Class III Angina with known severe 2 vessel CAD s/p PCI 10/2021 known to Dr. Hauser as an outpatient but cancelled surgery multiple times since October 2021 presented to [facility] ED today complaining of chest pain. Patient states the chest pain is located on the left side and pressure in nature, 7/10 without radiation. He states the pain started this morning and has not improved. States he does get GARCIA and chest pain on exertion for the past couple of months that typically resolves with rest. He states he is able to ambulate 1/2 block prior to getting symptoms which is less than 1 block 6 months prior.  He denies any nasuea/vomiting, dizziness, syncope, fever/chills, hematuria, hematochezia, LE edema. Due to known CAD, CT surgery called and patient admitted for surgical evaluation

## 2022-03-09 NOTE — H&P ADULT - ASSESSMENT
A/P: 66 year old male, former smoker, with PMHx of HTN, HLD, Hep C, BPH, COVID 2021, Class III Angina with known severe 2 vessel CAD s/p PCI 10/2021 known to Dr. Hauser as an outpatient but cancelled surgery multiple times since October 2021 presented to [facility] ED today complaining of chest pain. Due to known CAD, CT surgery called and patient admitted for surgical evaluation     Neurovascular: Stable. No active issues     Cardiovascular: Hemodynamically stable. HR controlled.  - Chest pain, SLN given. F/u Cardiac enzymes   - Known 2V CAD (Ramus and LAD), plan for MIDCAB tomorrow with Dr. Hauser pending preoperative workup including CBC, CMP, Coags, Lipid Panel, TSH, Hemoglobin A1c, Pro-BNP, Cardiac Enzymes, Type & Screen x 2, UA, Carotid US, TTE, CXR Pa/Lat, EKG, Bedside PFTS   - Plan for hybrid PCI with Dr. Rivero on Monday 3/14 to Ramus   - Continue asa/statin. Start metoprolol 12.5mg BID   - HLD, continue fenofibrate 145mg   - Previous PCI 10/2021, not on plavix at home   - Informed consent obtained and in chart.     Respiratory: 02 Sat = 99% on RA.  - CXR pending   - Encourage C+DB and Use of IS 10x / hr while awake.    GI: Stable.  - Continue PO Diet   - NPO after midnight for OR tomorrow   - Protonix 40mg for GI ppx     Renal / :  - Monitor renal function.  - Monitor I/O's.    Endocrine:  Hgba1c 5.6 and TSH pending   - no hx of DM or thyroid disease     Prophylaxis:  - DVT prophylaxis with 5000 SubQ Heparin q8h.  - SCD's    Disposition: Admit for OR of known CAD

## 2022-03-09 NOTE — ED PROVIDER NOTE - CLINICAL SUMMARY MEDICAL DECISION MAKING FREE TEXT BOX
66y Male with a PMH CAD s/p cardiac stent p/w burning chest pain and shortness of breath for 1 day, worse on exertion. AF, /83 other VSS. Physical exam unremarkable. Cardiac labs pending.... EKG normal sinus rhythm, no ST abnormalities. Most likely unstable angina. Calling to let CT surgery know patient is here as he is patient of Dr. Hauser. 66y Male with a PMH CAD s/p cardiac stent p/w burning chest pain and shortness of breath for 1 day, worse on exertion. AF, /83 other VSS. Physical exam unremarkable. Cardiac labs pending.... EKG normal sinus rhythm, no ST abnormalities. CT surgery consulted. Admit patient to 9 [address] CT surgery.

## 2022-03-09 NOTE — ED PROVIDER NOTE - ATTENDING CONTRIBUTION TO CARE
patient w CP similar to prior to stent, EKG ok , pain mild / tight , discussed w CT surgery, plans for admission to CT for emergent CABG, discussed w CT PA , will give nitroglycerin SL, received asa , no further anticoag

## 2022-03-09 NOTE — ED ADULT NURSE NOTE - NSIMPLEMENTINTERV_GEN_ALL_ED
Implemented All Universal Safety Interventions:  [place] to call system. Call bell, personal items and telephone within reach. Instruct patient to call for assistance. Room bathroom lighting operational. Non-slip footwear when patient is off stretcher. Physically safe environment: no spills, clutter or unnecessary equipment. Stretcher in lowest position, wheels locked, appropriate side rails in place.

## 2022-03-09 NOTE — H&P ADULT - NSHPSOCIALHISTORY_GEN_ALL_CORE
Social History  Smoker:   Former, quit 12 years ago. 10 years x 1ppd   ETOH Use:  Denies   Ilicit Drug Use: Denies   Assistant Devices:   None  Lives with: Daughter

## 2022-03-09 NOTE — H&P ADULT - NSHPPHYSICALEXAM_GEN_ALL_CORE
Vital Signs:   T: 98.1 F   HR: 73   BP: 155/83   RR: 17  O2 Sat:  99% RA       Appearance: No acute distress.  Neurologic: AAOx3, no AMS or focal deficits.  Responds appropriately to verbal and physical stimuli; exhibits purposeful movement in all extremities. UE and LE strength 5/5 bilaterally   HEENT:   MMM, PERRLA,   Neck: Supple, - JVD;   Cardiovascular: RRR, S1 S2. No m/r/g.  Respiratory: No acute respiratory distress. CTA b/l, no w/r/r.   Gastrointestinal:  Soft, non-tender, non-distended, + BS.	  Skin: No rashes. No ecchymoses. No cyanosis.  Extremities: Exhibits normal range of motion, no clubbing, cyanosis or edema.  Vascular: Peripheral pulses palpable 2+ bilaterally.

## 2022-03-10 ENCOUNTER — APPOINTMENT (OUTPATIENT)
Dept: CARDIOTHORACIC SURGERY | Facility: HOSPITAL | Age: 67
End: 2022-03-10

## 2022-03-10 DIAGNOSIS — Z09 ENCOUNTER FOR FOLLOW-UP EXAMINATION AFTER COMPLETED TREATMENT FOR CONDITIONS OTHER THAN MALIGNANT NEOPLASM: ICD-10-CM

## 2022-03-10 LAB
ALBUMIN SERPL ELPH-MCNC: 4.1 G/DL — SIGNIFICANT CHANGE UP (ref 3.3–5)
ALBUMIN SERPL ELPH-MCNC: 4.3 G/DL — SIGNIFICANT CHANGE UP (ref 3.3–5)
ALP SERPL-CCNC: 36 U/L — LOW (ref 40–120)
ALP SERPL-CCNC: 38 U/L — LOW (ref 40–120)
ALT FLD-CCNC: 74 U/L — HIGH (ref 10–45)
ALT FLD-CCNC: 79 U/L — HIGH (ref 10–45)
ANION GAP SERPL CALC-SCNC: 12 MMOL/L — SIGNIFICANT CHANGE UP (ref 5–17)
ANION GAP SERPL CALC-SCNC: 14 MMOL/L — SIGNIFICANT CHANGE UP (ref 5–17)
ANION GAP SERPL CALC-SCNC: 15 MMOL/L — SIGNIFICANT CHANGE UP (ref 5–17)
ANISOCYTOSIS BLD QL: SLIGHT — SIGNIFICANT CHANGE UP
APTT BLD: 36 SEC — HIGH (ref 27.5–35.5)
APTT BLD: 39.9 SEC — HIGH (ref 27.5–35.5)
AST SERPL-CCNC: 54 U/L — HIGH (ref 10–40)
AST SERPL-CCNC: 57 U/L — HIGH (ref 10–40)
BASOPHILS # BLD AUTO: 0.11 K/UL — SIGNIFICANT CHANGE UP (ref 0–0.2)
BASOPHILS # BLD AUTO: 0.19 K/UL — SIGNIFICANT CHANGE UP (ref 0–0.2)
BASOPHILS NFR BLD AUTO: 0.8 % — SIGNIFICANT CHANGE UP (ref 0–2)
BASOPHILS NFR BLD AUTO: 1.8 % — SIGNIFICANT CHANGE UP (ref 0–2)
BILIRUB SERPL-MCNC: 0.4 MG/DL — SIGNIFICANT CHANGE UP (ref 0.2–1.2)
BILIRUB SERPL-MCNC: 0.6 MG/DL — SIGNIFICANT CHANGE UP (ref 0.2–1.2)
BLASTS # FLD: 1.8 % — HIGH (ref 0–0)
BUN SERPL-MCNC: 14 MG/DL — SIGNIFICANT CHANGE UP (ref 7–23)
BUN SERPL-MCNC: 16 MG/DL — SIGNIFICANT CHANGE UP (ref 7–23)
BUN SERPL-MCNC: 21 MG/DL — SIGNIFICANT CHANGE UP (ref 7–23)
CALCIUM SERPL-MCNC: 8.6 MG/DL — SIGNIFICANT CHANGE UP (ref 8.4–10.5)
CALCIUM SERPL-MCNC: 8.7 MG/DL — SIGNIFICANT CHANGE UP (ref 8.4–10.5)
CALCIUM SERPL-MCNC: 9.3 MG/DL — SIGNIFICANT CHANGE UP (ref 8.4–10.5)
CHLORIDE SERPL-SCNC: 103 MMOL/L — SIGNIFICANT CHANGE UP (ref 96–108)
CHLORIDE SERPL-SCNC: 103 MMOL/L — SIGNIFICANT CHANGE UP (ref 96–108)
CHLORIDE SERPL-SCNC: 104 MMOL/L — SIGNIFICANT CHANGE UP (ref 96–108)
CO2 SERPL-SCNC: 19 MMOL/L — LOW (ref 22–31)
CO2 SERPL-SCNC: 21 MMOL/L — LOW (ref 22–31)
CO2 SERPL-SCNC: 21 MMOL/L — LOW (ref 22–31)
CREAT SERPL-MCNC: 0.95 MG/DL — SIGNIFICANT CHANGE UP (ref 0.5–1.3)
CREAT SERPL-MCNC: 1.06 MG/DL — SIGNIFICANT CHANGE UP (ref 0.5–1.3)
CREAT SERPL-MCNC: 1.18 MG/DL — SIGNIFICANT CHANGE UP (ref 0.5–1.3)
DACRYOCYTES BLD QL SMEAR: SLIGHT — SIGNIFICANT CHANGE UP
EGFR: 68 ML/MIN/1.73M2 — SIGNIFICANT CHANGE UP
EGFR: 77 ML/MIN/1.73M2 — SIGNIFICANT CHANGE UP
EGFR: 88 ML/MIN/1.73M2 — SIGNIFICANT CHANGE UP
EOSINOPHIL # BLD AUTO: 0.06 K/UL — SIGNIFICANT CHANGE UP (ref 0–0.5)
EOSINOPHIL # BLD AUTO: 0.19 K/UL — SIGNIFICANT CHANGE UP (ref 0–0.5)
EOSINOPHIL NFR BLD AUTO: 0.4 % — SIGNIFICANT CHANGE UP (ref 0–6)
EOSINOPHIL NFR BLD AUTO: 1.8 % — SIGNIFICANT CHANGE UP (ref 0–6)
GAS PNL BLDA: SIGNIFICANT CHANGE UP
GAS PNL BLDA: SIGNIFICANT CHANGE UP
GIANT PLATELETS BLD QL SMEAR: PRESENT — SIGNIFICANT CHANGE UP
GLUCOSE BLDC GLUCOMTR-MCNC: 100 MG/DL — HIGH (ref 70–99)
GLUCOSE BLDC GLUCOMTR-MCNC: 107 MG/DL — HIGH (ref 70–99)
GLUCOSE BLDC GLUCOMTR-MCNC: 111 MG/DL — HIGH (ref 70–99)
GLUCOSE BLDC GLUCOMTR-MCNC: 138 MG/DL — HIGH (ref 70–99)
GLUCOSE SERPL-MCNC: 110 MG/DL — HIGH (ref 70–99)
GLUCOSE SERPL-MCNC: 119 MG/DL — HIGH (ref 70–99)
GLUCOSE SERPL-MCNC: 151 MG/DL — HIGH (ref 70–99)
HCT VFR BLD CALC: 37.1 % — LOW (ref 39–50)
HCT VFR BLD CALC: 41.9 % — SIGNIFICANT CHANGE UP (ref 39–50)
HCT VFR BLD CALC: 44 % — SIGNIFICANT CHANGE UP (ref 39–50)
HCV AB S/CO SERPL IA: 105.7 S/CO — HIGH
HCV AB SERPL-IMP: REACTIVE
HGB BLD-MCNC: 12.7 G/DL — LOW (ref 13–17)
HGB BLD-MCNC: 13.9 G/DL — SIGNIFICANT CHANGE UP (ref 13–17)
HGB BLD-MCNC: 14.7 G/DL — SIGNIFICANT CHANGE UP (ref 13–17)
IMM GRANULOCYTES NFR BLD AUTO: 0.7 % — SIGNIFICANT CHANGE UP (ref 0–1.5)
INR BLD: 0.95 — SIGNIFICANT CHANGE UP (ref 0.88–1.16)
INR BLD: 1.07 — SIGNIFICANT CHANGE UP (ref 0.88–1.16)
ISTAT ARTERIAL BE: -2 MMOL/L — SIGNIFICANT CHANGE UP (ref -2–3)
ISTAT ARTERIAL GLUCOSE: 151 MG/DL — HIGH (ref 70–99)
ISTAT ARTERIAL HCO3: 23 MMOL/L — SIGNIFICANT CHANGE UP (ref 22–26)
ISTAT ARTERIAL HEMATOCRIT: 43 % — SIGNIFICANT CHANGE UP (ref 39–50)
ISTAT ARTERIAL HEMOGLOBIN: 14.6 G/DL — SIGNIFICANT CHANGE UP (ref 13–17)
ISTAT ARTERIAL IONIZED CALCIUM: 1.23 MMOL/L — SIGNIFICANT CHANGE UP (ref 1.12–1.3)
ISTAT ARTERIAL PCO2: 42 MMHG — SIGNIFICANT CHANGE UP (ref 35–45)
ISTAT ARTERIAL PH: 7.36 — SIGNIFICANT CHANGE UP (ref 7.35–7.45)
ISTAT ARTERIAL PO2: 114 MMHG — HIGH (ref 80–105)
ISTAT ARTERIAL POTASSIUM: 3.6 MMOL/L — SIGNIFICANT CHANGE UP (ref 3.5–5.3)
ISTAT ARTERIAL SO2: 98 % — SIGNIFICANT CHANGE UP (ref 95–98)
ISTAT ARTERIAL SODIUM: 138 MMOL/L — SIGNIFICANT CHANGE UP (ref 135–145)
ISTAT ARTERIAL TCO2: 25 MMOL/L — SIGNIFICANT CHANGE UP (ref 22–31)
LYMPHOCYTES # BLD AUTO: 17.7 % — SIGNIFICANT CHANGE UP (ref 13–44)
LYMPHOCYTES # BLD AUTO: 2.45 K/UL — SIGNIFICANT CHANGE UP (ref 1–3.3)
LYMPHOCYTES # BLD AUTO: 5.35 K/UL — HIGH (ref 1–3.3)
LYMPHOCYTES # BLD AUTO: 50.5 % — HIGH (ref 13–44)
MACROCYTES BLD QL: SLIGHT — SIGNIFICANT CHANGE UP
MAGNESIUM SERPL-MCNC: 1.5 MG/DL — LOW (ref 1.6–2.6)
MAGNESIUM SERPL-MCNC: 2.1 MG/DL — SIGNIFICANT CHANGE UP (ref 1.6–2.6)
MANUAL SMEAR VERIFICATION: SIGNIFICANT CHANGE UP
MCHC RBC-ENTMCNC: 30.2 PG — SIGNIFICANT CHANGE UP (ref 27–34)
MCHC RBC-ENTMCNC: 30.4 PG — SIGNIFICANT CHANGE UP (ref 27–34)
MCHC RBC-ENTMCNC: 31.4 PG — SIGNIFICANT CHANGE UP (ref 27–34)
MCHC RBC-ENTMCNC: 33.2 GM/DL — SIGNIFICANT CHANGE UP (ref 32–36)
MCHC RBC-ENTMCNC: 33.4 GM/DL — SIGNIFICANT CHANGE UP (ref 32–36)
MCHC RBC-ENTMCNC: 34.2 GM/DL — SIGNIFICANT CHANGE UP (ref 32–36)
MCV RBC AUTO: 90.5 FL — SIGNIFICANT CHANGE UP (ref 80–100)
MCV RBC AUTO: 91.6 FL — SIGNIFICANT CHANGE UP (ref 80–100)
MCV RBC AUTO: 91.7 FL — SIGNIFICANT CHANGE UP (ref 80–100)
MICROCYTES BLD QL: SLIGHT — SIGNIFICANT CHANGE UP
MONOCYTES # BLD AUTO: 0.48 K/UL — SIGNIFICANT CHANGE UP (ref 0–0.9)
MONOCYTES # BLD AUTO: 0.67 K/UL — SIGNIFICANT CHANGE UP (ref 0–0.9)
MONOCYTES NFR BLD AUTO: 4.5 % — SIGNIFICANT CHANGE UP (ref 2–14)
MONOCYTES NFR BLD AUTO: 4.8 % — SIGNIFICANT CHANGE UP (ref 2–14)
NEUTROPHILS # BLD AUTO: 10.44 K/UL — HIGH (ref 1.8–7.4)
NEUTROPHILS # BLD AUTO: 4.2 K/UL — SIGNIFICANT CHANGE UP (ref 1.8–7.4)
NEUTROPHILS NFR BLD AUTO: 39.6 % — LOW (ref 43–77)
NEUTROPHILS NFR BLD AUTO: 75.6 % — SIGNIFICANT CHANGE UP (ref 43–77)
NRBC # BLD: 0 /100 WBCS — SIGNIFICANT CHANGE UP (ref 0–0)
NRBC # BLD: 0 /100 WBCS — SIGNIFICANT CHANGE UP (ref 0–0)
OVALOCYTES BLD QL SMEAR: SLIGHT — SIGNIFICANT CHANGE UP
PHOSPHATE SERPL-MCNC: 2.7 MG/DL — SIGNIFICANT CHANGE UP (ref 2.5–4.5)
PHOSPHATE SERPL-MCNC: 3 MG/DL — SIGNIFICANT CHANGE UP (ref 2.5–4.5)
PLAT MORPH BLD: ABNORMAL
PLATELET # BLD AUTO: 261 K/UL — SIGNIFICANT CHANGE UP (ref 150–400)
PLATELET # BLD AUTO: 262 K/UL — SIGNIFICANT CHANGE UP (ref 150–400)
PLATELET # BLD AUTO: 299 K/UL — SIGNIFICANT CHANGE UP (ref 150–400)
POLYCHROMASIA BLD QL SMEAR: SLIGHT — SIGNIFICANT CHANGE UP
POTASSIUM SERPL-MCNC: 3.4 MMOL/L — LOW (ref 3.5–5.3)
POTASSIUM SERPL-MCNC: 3.8 MMOL/L — SIGNIFICANT CHANGE UP (ref 3.5–5.3)
POTASSIUM SERPL-MCNC: 3.9 MMOL/L — SIGNIFICANT CHANGE UP (ref 3.5–5.3)
POTASSIUM SERPL-SCNC: 3.4 MMOL/L — LOW (ref 3.5–5.3)
POTASSIUM SERPL-SCNC: 3.8 MMOL/L — SIGNIFICANT CHANGE UP (ref 3.5–5.3)
POTASSIUM SERPL-SCNC: 3.9 MMOL/L — SIGNIFICANT CHANGE UP (ref 3.5–5.3)
PROT SERPL-MCNC: 7.2 G/DL — SIGNIFICANT CHANGE UP (ref 6–8.3)
PROT SERPL-MCNC: 7.8 G/DL — SIGNIFICANT CHANGE UP (ref 6–8.3)
PROTHROM AB SERPL-ACNC: 11.3 SEC — SIGNIFICANT CHANGE UP (ref 10.5–13.4)
PROTHROM AB SERPL-ACNC: 12.8 SEC — SIGNIFICANT CHANGE UP (ref 10.5–13.4)
RBC # BLD: 4.05 M/UL — LOW (ref 4.2–5.8)
RBC # BLD: 4.57 M/UL — SIGNIFICANT CHANGE UP (ref 4.2–5.8)
RBC # BLD: 4.86 M/UL — SIGNIFICANT CHANGE UP (ref 4.2–5.8)
RBC # FLD: 12.8 % — SIGNIFICANT CHANGE UP (ref 10.3–14.5)
RBC # FLD: 12.8 % — SIGNIFICANT CHANGE UP (ref 10.3–14.5)
RBC # FLD: 12.9 % — SIGNIFICANT CHANGE UP (ref 10.3–14.5)
RBC BLD AUTO: ABNORMAL
SMUDGE CELLS # BLD: PRESENT — SIGNIFICANT CHANGE UP
SODIUM SERPL-SCNC: 136 MMOL/L — SIGNIFICANT CHANGE UP (ref 135–145)
SODIUM SERPL-SCNC: 138 MMOL/L — SIGNIFICANT CHANGE UP (ref 135–145)
SODIUM SERPL-SCNC: 138 MMOL/L — SIGNIFICANT CHANGE UP (ref 135–145)
SPHEROCYTES BLD QL SMEAR: SLIGHT — SIGNIFICANT CHANGE UP
T3 SERPL-MCNC: 86 NG/DL — SIGNIFICANT CHANGE UP (ref 80–200)
WBC # BLD: 10.6 K/UL — HIGH (ref 3.8–10.5)
WBC # BLD: 12.05 K/UL — HIGH (ref 3.8–10.5)
WBC # BLD: 13.83 K/UL — HIGH (ref 3.8–10.5)
WBC # FLD AUTO: 10.6 K/UL — HIGH (ref 3.8–10.5)
WBC # FLD AUTO: 12.05 K/UL — HIGH (ref 3.8–10.5)
WBC # FLD AUTO: 13.83 K/UL — HIGH (ref 3.8–10.5)

## 2022-03-10 PROCEDURE — 99291 CRITICAL CARE FIRST HOUR: CPT

## 2022-03-10 PROCEDURE — 33533 CABG ARTERIAL SINGLE: CPT

## 2022-03-10 PROCEDURE — 99292 CRITICAL CARE ADDL 30 MIN: CPT

## 2022-03-10 PROCEDURE — 93308 TTE F-UP OR LMTD: CPT | Mod: 26

## 2022-03-10 PROCEDURE — 71045 X-RAY EXAM CHEST 1 VIEW: CPT | Mod: 26

## 2022-03-10 DEVICE — SHUNT CORONARY MAQUET AXIUS 1.5MM: Type: IMPLANTABLE DEVICE | Status: FUNCTIONAL

## 2022-03-10 DEVICE — SHUNT FLO-THRU INTRALUMINAL 3MM X 18MM: Type: IMPLANTABLE DEVICE | Status: FUNCTIONAL

## 2022-03-10 DEVICE — SHUNT CORONARY MAQUET AXIUS 2.75MM: Type: IMPLANTABLE DEVICE | Status: FUNCTIONAL

## 2022-03-10 DEVICE — SHUNT CORONARY MAQUET AXIUS 1.75MM: Type: IMPLANTABLE DEVICE | Status: FUNCTIONAL

## 2022-03-10 DEVICE — SHUNT FLO-THRU INTRALUMINAL 2.5MM X 18MM: Type: IMPLANTABLE DEVICE | Status: FUNCTIONAL

## 2022-03-10 DEVICE — CHEST DRAIN THORACIC PVC 32FR RIGHT ANGLE: Type: IMPLANTABLE DEVICE | Status: FUNCTIONAL

## 2022-03-10 DEVICE — SHUNT FLO-THRU INTRALUMINAL1.75MM X 18MM: Type: IMPLANTABLE DEVICE | Status: FUNCTIONAL

## 2022-03-10 DEVICE — LIGATING CLIPS WECK HEMOLOK POLYMER LARGE (PURPLE) 6: Type: IMPLANTABLE DEVICE | Status: FUNCTIONAL

## 2022-03-10 DEVICE — SHUNT CORONARY MAQUET AXIUS 1.25MM: Type: IMPLANTABLE DEVICE | Status: FUNCTIONAL

## 2022-03-10 DEVICE — CHEST DRAIN THORACIC PVC 28FR RIGHT ANGLE: Type: IMPLANTABLE DEVICE | Status: FUNCTIONAL

## 2022-03-10 DEVICE — SHUNT CORONARY MAQUET AXIUS 2.25MM: Type: IMPLANTABLE DEVICE | Status: FUNCTIONAL

## 2022-03-10 DEVICE — SURGICEL 4 X 8": Type: IMPLANTABLE DEVICE | Status: FUNCTIONAL

## 2022-03-10 DEVICE — SHUNT FLO-THRU INTRALUMINAL 2MM X 18MM: Type: IMPLANTABLE DEVICE | Status: FUNCTIONAL

## 2022-03-10 DEVICE — SHUNT CORONARY MAQUET AXIUS 2.5MM: Type: IMPLANTABLE DEVICE | Status: FUNCTIONAL

## 2022-03-10 DEVICE — SHUNT FLO-THRU INTRALUMINAL 1.25MM X 18MM: Type: IMPLANTABLE DEVICE | Status: FUNCTIONAL

## 2022-03-10 DEVICE — SHUNT CORONARY MAQUET AXIUS 2MM: Type: IMPLANTABLE DEVICE | Status: FUNCTIONAL

## 2022-03-10 DEVICE — SHUNT FLO-THRU INTRALUMINAL1.5MM X 18MM: Type: IMPLANTABLE DEVICE | Status: FUNCTIONAL

## 2022-03-10 DEVICE — CHEST DRAIN THORACIC PVC 32FR: Type: IMPLANTABLE DEVICE | Status: FUNCTIONAL

## 2022-03-10 RX ORDER — ACETAMINOPHEN 500 MG
1000 TABLET ORAL ONCE
Refills: 0 | Status: COMPLETED | OUTPATIENT
Start: 2022-03-10 | End: 2022-03-10

## 2022-03-10 RX ORDER — FENTANYL CITRATE 50 UG/ML
25 INJECTION INTRAVENOUS ONCE
Refills: 0 | Status: DISCONTINUED | OUTPATIENT
Start: 2022-03-10 | End: 2022-03-10

## 2022-03-10 RX ORDER — KETOROLAC TROMETHAMINE 30 MG/ML
15 SYRINGE (ML) INJECTION EVERY 6 HOURS
Refills: 0 | Status: DISCONTINUED | OUTPATIENT
Start: 2022-03-10 | End: 2022-03-11

## 2022-03-10 RX ORDER — ALBUMIN HUMAN 25 %
250 VIAL (ML) INTRAVENOUS ONCE
Refills: 0 | Status: COMPLETED | OUTPATIENT
Start: 2022-03-10 | End: 2022-03-10

## 2022-03-10 RX ORDER — CLOPIDOGREL BISULFATE 75 MG/1
75 TABLET, FILM COATED ORAL DAILY
Refills: 0 | Status: DISCONTINUED | OUTPATIENT
Start: 2022-03-10 | End: 2022-03-15

## 2022-03-10 RX ORDER — SODIUM CHLORIDE 9 MG/ML
1000 INJECTION, SOLUTION INTRAVENOUS
Refills: 0 | Status: DISCONTINUED | OUTPATIENT
Start: 2022-03-10 | End: 2022-03-11

## 2022-03-10 RX ORDER — BUPIVACAINE 13.3 MG/ML
20 INJECTION, SUSPENSION, LIPOSOMAL INFILTRATION ONCE
Refills: 0 | Status: DISCONTINUED | OUTPATIENT
Start: 2022-03-10 | End: 2022-03-10

## 2022-03-10 RX ORDER — POTASSIUM CHLORIDE 20 MEQ
20 PACKET (EA) ORAL
Refills: 0 | Status: COMPLETED | OUTPATIENT
Start: 2022-03-10 | End: 2022-03-10

## 2022-03-10 RX ORDER — MAGNESIUM SULFATE 500 MG/ML
2 VIAL (ML) INJECTION ONCE
Refills: 0 | Status: COMPLETED | OUTPATIENT
Start: 2022-03-10 | End: 2022-03-10

## 2022-03-10 RX ORDER — CEFAZOLIN SODIUM 1 G
2000 VIAL (EA) INJECTION EVERY 8 HOURS
Refills: 0 | Status: COMPLETED | OUTPATIENT
Start: 2022-03-10 | End: 2022-03-11

## 2022-03-10 RX ORDER — ALBUMIN HUMAN 25 %
250 VIAL (ML) INTRAVENOUS
Refills: 0 | Status: COMPLETED | OUTPATIENT
Start: 2022-03-10 | End: 2022-03-10

## 2022-03-10 RX ORDER — POTASSIUM CHLORIDE 20 MEQ
20 PACKET (EA) ORAL ONCE
Refills: 0 | Status: COMPLETED | OUTPATIENT
Start: 2022-03-10 | End: 2022-03-10

## 2022-03-10 RX ORDER — NICARDIPINE HYDROCHLORIDE 30 MG/1
5 CAPSULE, EXTENDED RELEASE ORAL
Qty: 40 | Refills: 0 | Status: DISCONTINUED | OUTPATIENT
Start: 2022-03-10 | End: 2022-03-11

## 2022-03-10 RX ORDER — POLYETHYLENE GLYCOL 3350 17 G/17G
17 POWDER, FOR SOLUTION ORAL DAILY
Refills: 0 | Status: DISCONTINUED | OUTPATIENT
Start: 2022-03-10 | End: 2022-03-15

## 2022-03-10 RX ORDER — INSULIN LISPRO 100/ML
VIAL (ML) SUBCUTANEOUS
Refills: 0 | Status: DISCONTINUED | OUTPATIENT
Start: 2022-03-10 | End: 2022-03-13

## 2022-03-10 RX ORDER — DEXTROSE 50 % IN WATER 50 %
12.5 SYRINGE (ML) INTRAVENOUS ONCE
Refills: 0 | Status: DISCONTINUED | OUTPATIENT
Start: 2022-03-10 | End: 2022-03-11

## 2022-03-10 RX ORDER — SODIUM CHLORIDE 9 MG/ML
1000 INJECTION INTRAMUSCULAR; INTRAVENOUS; SUBCUTANEOUS
Refills: 0 | Status: DISCONTINUED | OUTPATIENT
Start: 2022-03-10 | End: 2022-03-11

## 2022-03-10 RX ORDER — CALCIUM GLUCONATE 100 MG/ML
2 VIAL (ML) INTRAVENOUS ONCE
Refills: 0 | Status: COMPLETED | OUTPATIENT
Start: 2022-03-10 | End: 2022-03-10

## 2022-03-10 RX ORDER — GLUCAGON INJECTION, SOLUTION 0.5 MG/.1ML
1 INJECTION, SOLUTION SUBCUTANEOUS ONCE
Refills: 0 | Status: DISCONTINUED | OUTPATIENT
Start: 2022-03-10 | End: 2022-03-15

## 2022-03-10 RX ORDER — SENNA PLUS 8.6 MG/1
2 TABLET ORAL AT BEDTIME
Refills: 0 | Status: DISCONTINUED | OUTPATIENT
Start: 2022-03-10 | End: 2022-03-15

## 2022-03-10 RX ORDER — DEXTROSE 50 % IN WATER 50 %
25 SYRINGE (ML) INTRAVENOUS ONCE
Refills: 0 | Status: DISCONTINUED | OUTPATIENT
Start: 2022-03-10 | End: 2022-03-11

## 2022-03-10 RX ORDER — PANTOPRAZOLE SODIUM 20 MG/1
40 TABLET, DELAYED RELEASE ORAL DAILY
Refills: 0 | Status: DISCONTINUED | OUTPATIENT
Start: 2022-03-10 | End: 2022-03-11

## 2022-03-10 RX ORDER — DEXTROSE 50 % IN WATER 50 %
15 SYRINGE (ML) INTRAVENOUS ONCE
Refills: 0 | Status: DISCONTINUED | OUTPATIENT
Start: 2022-03-10 | End: 2022-03-11

## 2022-03-10 RX ADMIN — Medication 50 MILLIEQUIVALENT(S): at 20:15

## 2022-03-10 RX ADMIN — CHLORHEXIDINE GLUCONATE 5 MILLILITER(S): 213 SOLUTION TOPICAL at 05:37

## 2022-03-10 RX ADMIN — HEPARIN SODIUM 5000 UNIT(S): 5000 INJECTION INTRAVENOUS; SUBCUTANEOUS at 14:53

## 2022-03-10 RX ADMIN — ATORVASTATIN CALCIUM 20 MILLIGRAM(S): 80 TABLET, FILM COATED ORAL at 21:34

## 2022-03-10 RX ADMIN — Medication 125 MILLILITER(S): at 18:28

## 2022-03-10 RX ADMIN — Medication 125 MILLILITER(S): at 20:42

## 2022-03-10 RX ADMIN — FENTANYL CITRATE 25 MICROGRAM(S): 50 INJECTION INTRAVENOUS at 14:45

## 2022-03-10 RX ADMIN — Medication 200 GRAM(S): at 21:34

## 2022-03-10 RX ADMIN — SODIUM CHLORIDE 3 MILLILITER(S): 9 INJECTION INTRAMUSCULAR; INTRAVENOUS; SUBCUTANEOUS at 05:34

## 2022-03-10 RX ADMIN — Medication 1000 MILLIGRAM(S): at 21:00

## 2022-03-10 RX ADMIN — SODIUM CHLORIDE 10 MILLILITER(S): 9 INJECTION INTRAMUSCULAR; INTRAVENOUS; SUBCUTANEOUS at 21:34

## 2022-03-10 RX ADMIN — HEPARIN SODIUM 5000 UNIT(S): 5000 INJECTION INTRAVENOUS; SUBCUTANEOUS at 05:36

## 2022-03-10 RX ADMIN — Medication 81 MILLIGRAM(S): at 16:11

## 2022-03-10 RX ADMIN — Medication 100 MILLIGRAM(S): at 21:42

## 2022-03-10 RX ADMIN — Medication 25 GRAM(S): at 20:15

## 2022-03-10 RX ADMIN — HEPARIN SODIUM 5000 UNIT(S): 5000 INJECTION INTRAVENOUS; SUBCUTANEOUS at 21:42

## 2022-03-10 RX ADMIN — Medication 125 MILLILITER(S): at 19:15

## 2022-03-10 RX ADMIN — Medication 50 MILLIEQUIVALENT(S): at 21:46

## 2022-03-10 RX ADMIN — CLOPIDOGREL BISULFATE 75 MILLIGRAM(S): 75 TABLET, FILM COATED ORAL at 16:11

## 2022-03-10 RX ADMIN — Medication 50 MILLIEQUIVALENT(S): at 23:31

## 2022-03-10 RX ADMIN — Medication 145 MILLIGRAM(S): at 14:53

## 2022-03-10 RX ADMIN — PANTOPRAZOLE SODIUM 40 MILLIGRAM(S): 20 TABLET, DELAYED RELEASE ORAL at 12:56

## 2022-03-10 RX ADMIN — Medication 12.5 MILLIGRAM(S): at 05:36

## 2022-03-10 RX ADMIN — Medication 1 MILLIGRAM(S): at 14:53

## 2022-03-10 RX ADMIN — FENTANYL CITRATE 25 MICROGRAM(S): 50 INJECTION INTRAVENOUS at 11:40

## 2022-03-10 RX ADMIN — Medication 50 MILLIEQUIVALENT(S): at 13:00

## 2022-03-10 RX ADMIN — Medication 400 MILLIGRAM(S): at 20:42

## 2022-03-10 RX ADMIN — Medication 125 MILLILITER(S): at 18:50

## 2022-03-10 RX ADMIN — Medication 100 MILLIGRAM(S): at 16:12

## 2022-03-10 RX ADMIN — FENTANYL CITRATE 25 MICROGRAM(S): 50 INJECTION INTRAVENOUS at 14:30

## 2022-03-10 RX ADMIN — SENNA PLUS 2 TABLET(S): 8.6 TABLET ORAL at 21:34

## 2022-03-10 RX ADMIN — CHLORHEXIDINE GLUCONATE 1 APPLICATION(S): 213 SOLUTION TOPICAL at 05:00

## 2022-03-10 RX ADMIN — PANTOPRAZOLE SODIUM 40 MILLIGRAM(S): 20 TABLET, DELAYED RELEASE ORAL at 05:36

## 2022-03-10 RX ADMIN — FENTANYL CITRATE 25 MICROGRAM(S): 50 INJECTION INTRAVENOUS at 12:00

## 2022-03-10 NOTE — PATIENT PROFILE ADULT - FALL HARM RISK - UNIVERSAL INTERVENTIONS
Vital Signs Last 24 Hrs  T(C): 36.8 (01 Nov 2018 22:19), Max: 36.8 (01 Nov 2018 17:48)  T(F): 98.3 (01 Nov 2018 22:19), Max: 98.3 (01 Nov 2018 17:48)  HR: 60 (01 Nov 2018 22:19) (60 - 70)  BP: 123/70 (01 Nov 2018 22:19) (119/76 - 133/66)  BP(mean): --  RR: 16 (01 Nov 2018 22:19) (16 - 17)  SpO2: 97% (01 Nov 2018 22:19) (97% - 98%)  Daily     Daily   CAPILLARY BLOOD GLUCOSE        I&O's Summary      GENERAL: NAD  HEAD:  Normocephalic  EYES: EOMI, PERRLA, conjunctiva and sclera clear  ENMT: No tonsillar erythema, exudates, or enlargement; Moist mucous membranes, No lesions  NECK: Supple, No JVD, no bruit, normal thyroid  NERVOUS SYSTEM:  Alert & Oriented X3, grossly moves all fours  CHEST/LUNG: Clear to auscultation bilaterally; No rales, rhonchi, wheezing, or rubs  HEART: irregular rate and rhythm; + murmurs, rubs, or gallops  ABDOMEN: Soft, Nontender, Nondistended; Bowel sounds present  EXTREMITIES:  2+ Peripheral Pulses, No clubbing, cyanosis, BL 2+edema with chronic venous stasis.   LYMPH: No lymphadenopathy noted  SKIN: No rashes or lesions Bed in lowest position, wheels locked, appropriate side rails in place/Call bell, personal items and telephone in reach/Instruct patient to call for assistance before getting out of bed or chair/Non-slip footwear when patient is out of bed/[place] to call system/Physically safe environment - no spills, clutter or unnecessary equipment/Purposeful Proactive Rounding/Room/bathroom lighting operational, light cord in reach

## 2022-03-10 NOTE — PROGRESS NOTE ADULT - SUBJECTIVE AND OBJECTIVE BOX
CTICU  CRITICAL  CARE  attending     Hand off received 					   Pertinent clinical, laboratory, radiographic, hemodynamic, echocardiographic, respiratory data, microbiologic data and chart were reviewed and analyzed frequently throughout the course of the day and night  Patient seen and examined with CTS/ SH attending at bedside  Pt is a 66y , Male, HEALTH ISSUES - PROBLEM Dx:      , FAMILY HISTORY:  PAST MEDICAL & SURGICAL HISTORY:  Hypertension    Hyperlipidemia    Hepatitis C    History of BPH    No significant past surgical history      Patient is a 66y old  Male who presents with a chief complaint of CAD (13 Mar 2022 16:47)      14 system review limited by mentation and multiorgan morbidity     Vital signs, hemodynamic and respiratory parameters were reviewed from the bedside nursing flowsheet.  ICU Vital Signs Last 24 Hrs  T(C): 37.6 (13 Mar 2022 14:00), Max: 38.6 (13 Mar 2022 05:47)  T(F): 99.7 (13 Mar 2022 14:00), Max: 101.4 (13 Mar 2022 05:47)  HR: 76 (13 Mar 2022 13:27) (64 - 76)  BP: 132/69 (13 Mar 2022 13:27) (110/63 - 140/82)  BP(mean): 95 (13 Mar 2022 13:27) (79 - 105)  ABP: --  ABP(mean): --  RR: 20 (13 Mar 2022 13:27) (15 - 20)  SpO2: 96% (13 Mar 2022 13:27) (94% - 96%)    Adult Advanced Hemodynamics Last 24 Hrs  CVP(mm Hg): --  CVP(cm H2O): --  CO: --  CI: --  PA: --  PA(mean): --  PCWP: --  SVR: --  SVRI: --  PVR: --  PVRI: --,     Intake and output was reviewed and the fluid balance was calculated  Daily     Daily   I&O's Summary    12 Mar 2022 06:01  -  13 Mar 2022 07:00  --------------------------------------------------------  IN: 0 mL / OUT: 850 mL / NET: -850 mL    13 Mar 2022 07:01  -  13 Mar 2022 19:03  --------------------------------------------------------  IN: 0 mL / OUT: 450 mL / NET: -450 mL        All lines and drain sites were assessed  [facility]      POCT Blood Glucose.: 104 mg/dL (13 Mar 2022 12:05)    No acute change in focality  Auscultation of the chest reveals equal bs  Abdomen is soft  Extremities are warm and well perfused  Wounds appear clean and unremarkable  Antibiotics are periop    labs  CBC Full  -  ( 13 Mar 2022 05:44 )  WBC Count : 12.99 K/uL  RBC Count : 4.28 M/uL  Hemoglobin : 13.4 g/dL  Hematocrit : 40.2 %  Platelet Count - Automated : 232 K/uL  Mean Cell Volume : 93.9 fl  Mean Cell Hemoglobin : 31.3 pg  Mean Cell Hemoglobin Concentration : 33.3 gm/dL  Auto Neutrophil # : x  Auto Lymphocyte # : x  Auto Monocyte # : x  Auto Eosinophil # : x  Auto Basophil # : x  Auto Neutrophil % : x  Auto Lymphocyte % : x  Auto Monocyte % : x  Auto Eosinophil % : x  Auto Basophil % : x    03-13    139  |  105  |  15  ----------------------------<  100<H>  4.0   |  21<L>  |  1.07    Ca    9.0      13 Mar 2022 05:44  Mg     2.2     03-12    TPro  6.9  /  Alb  3.8  /  TBili  0.9  /  DBili  x   /  AST  48<H>  /  ALT  38  /  AlkPhos  42  03-13    PTT - ( 12 Mar 2022 06:26 )  PTT:45.3 sec  The current medications were reviewed   MEDICATIONS  (STANDING):  acetaminophen     Tablet .. 650 milliGRAM(s) Oral every 6 hours  aspirin enteric coated 81 milliGRAM(s) Oral daily  atorvastatin 20 milliGRAM(s) Oral at bedtime  clopidogrel Tablet 75 milliGRAM(s) Oral daily  fenofibrate Tablet 145 milliGRAM(s) Oral daily  folic acid 1 milliGRAM(s) Oral daily  glucagon  Injectable 1 milliGRAM(s) IntraMuscular once  heparin   Injectable 5000 Unit(s) SubCutaneous every 12 hours  lidocaine   4% Patch 1 Patch Transdermal daily  metoprolol tartrate 12.5 milliGRAM(s) Oral every 12 hours  pantoprazole    Tablet 40 milliGRAM(s) Oral before breakfast  polyethylene glycol 3350 17 Gram(s) Oral daily  senna 2 Tablet(s) Oral at bedtime  sodium chloride 0.9% lock flush 3 milliLiter(s) IV Push every 8 hours  sodium chloride 0.9%. 1000 milliLiter(s) (75 mL/Hr) IV Continuous <Continuous>    MEDICATIONS  (PRN):       PROBLEM LIST/ ASSESSMENT:  HEALTH ISSUES - PROBLEM Dx:      ,   Patient is a 66y old  Male who presents with a chief complaint of CAD (13 Mar 2022 16:47)     s/p cardiac surgery                My plan includes :  close hemodynamic, ventilatory and drain monitoring and management per post op routine    Monitor for arrhythmias and monitor parameters for organ perfusion  beta blockade not administered due to hemodynamic instability and bradycardia  monitor neurologic status  Head of the bed should remain elevated to 45 deg .   chest PT and IS will be encouraged  monitor adequacy of oxygenation and ventilation and attempt to wean oxygen  antibiotic regimen will be tailored to the clinical, laboratory and microbiologic data  Nutritional goals will be met using po eventually , ensure adequate caloric intake and montior the same  Stress ulcer and VTE prophylaxis will be achieved    Glycemic control is satisfactory  Electrolytes have been repleted as necessary and wound care has been carried out. Pain control has been achieved.   agressive physical therapy and early mobility and ambulation goals will be met   The family was updated about the course and plan  CRITICAL CARE TIME personally provided by me  in evaluation and management, reassessments, review and interpretation of labs and x-rays, ventilator and hemodynamic management, formulating a plan and coordinating care: ___90____ MIN.  Time does not include procedural time. Time spent was non routine post-operarive caRE and included multiple and repeated evaluations at the bedside  CTICU ATTENDING     					    Elio Gutierres MD

## 2022-03-10 NOTE — CHART NOTE - NSCHARTNOTEFT_GEN_A_CORE
Limited Fellow TTE Note:    Indication: preop for MIDCAB +hybrid PCI  Quality: poor/fair    Findings:   Normal size and LV function. EF 50-55%.  RV not well visualized but probably normal  Trace AI, no other significant valve dx  No pericardial effusion    Final read in the am.    Please repeat formal TTE.

## 2022-03-10 NOTE — BRIEF OPERATIVE NOTE - NSICDXBRIEFPROCEDURE_GEN_ALL_CORE_FT
PROCEDURES:  CABG, minimally invasive, without cardiopulmonary pump oxygenation 10-Mar-2022 11:14:38 CARNEY to Bettie Mandujano

## 2022-03-10 NOTE — PROGRESS NOTE ADULT - SUBJECTIVE AND OBJECTIVE BOX
CTICU  CRITICAL  CARE  attending     Hand off received 					   Pertinent clinical, laboratory, radiographic, hemodynamic, echocardiographic, respiratory data, microbiologic data and chart were reviewed and analyzed frequently throughout the course of the day and night  Patient seen and examined with CTS/ SH attending at bedside  Pt is a 66y , Male, HEALTH ISSUES - PROBLEM Dx:      , FAMILY HISTORY:  PAST MEDICAL & SURGICAL HISTORY:  Hypertension    Hyperlipidemia    Hepatitis C    History of BPH    No significant past surgical history      Patient is a 66y old  Male who presents with a chief complaint of CAD (13 Mar 2022 16:47)      14 system review limited by mentation and multiorgan morbidity     Vital signs, hemodynamic and respiratory parameters were reviewed from the bedside nursing flowsheet.  ICU Vital Signs Last 24 Hrs  T(C): 37.6 (13 Mar 2022 14:00), Max: 38.6 (13 Mar 2022 05:47)  T(F): 99.7 (13 Mar 2022 14:00), Max: 101.4 (13 Mar 2022 05:47)  HR: 76 (13 Mar 2022 13:27) (64 - 76)  BP: 132/69 (13 Mar 2022 13:27) (110/63 - 140/82)  BP(mean): 95 (13 Mar 2022 13:27) (79 - 105)  ABP: --  ABP(mean): --  RR: 20 (13 Mar 2022 13:27) (15 - 20)  SpO2: 96% (13 Mar 2022 13:27) (94% - 96%)    Adult Advanced Hemodynamics Last 24 Hrs  CVP(mm Hg): --  CVP(cm H2O): --  CO: --  CI: --  PA: --  PA(mean): --  PCWP: --  SVR: --  SVRI: --  PVR: --  PVRI: --,     Intake and output was reviewed and the fluid balance was calculated  Daily     Daily   I&O's Summary    12 Mar 2022 06:01  -  13 Mar 2022 07:00  --------------------------------------------------------  IN: 0 mL / OUT: 850 mL / NET: -850 mL    13 Mar 2022 07:01  -  13 Mar 2022 19:02  --------------------------------------------------------  IN: 0 mL / OUT: 450 mL / NET: -450 mL        All lines and drain sites were assessed  [facility]      POCT Blood Glucose.: 104 mg/dL (13 Mar 2022 12:05)    No acute change in focality  Auscultation of the chest reveals equal bs  Abdomen is soft  Extremities are warm and well perfused  Wounds appear clean and unremarkable  Antibiotics are periop    labs  CBC Full  -  ( 13 Mar 2022 05:44 )  WBC Count : 12.99 K/uL  RBC Count : 4.28 M/uL  Hemoglobin : 13.4 g/dL  Hematocrit : 40.2 %  Platelet Count - Automated : 232 K/uL  Mean Cell Volume : 93.9 fl  Mean Cell Hemoglobin : 31.3 pg  Mean Cell Hemoglobin Concentration : 33.3 gm/dL  Auto Neutrophil # : x  Auto Lymphocyte # : x  Auto Monocyte # : x  Auto Eosinophil # : x  Auto Basophil # : x  Auto Neutrophil % : x  Auto Lymphocyte % : x  Auto Monocyte % : x  Auto Eosinophil % : x  Auto Basophil % : x    03-13    139  |  105  |  15  ----------------------------<  100<H>  4.0   |  21<L>  |  1.07    Ca    9.0      13 Mar 2022 05:44  Mg     2.2     03-12    TPro  6.9  /  Alb  3.8  /  TBili  0.9  /  DBili  x   /  AST  48<H>  /  ALT  38  /  AlkPhos  42  03-13    PTT - ( 12 Mar 2022 06:26 )  PTT:45.3 sec  The current medications were reviewed   MEDICATIONS  (STANDING):  acetaminophen     Tablet .. 650 milliGRAM(s) Oral every 6 hours  aspirin enteric coated 81 milliGRAM(s) Oral daily  atorvastatin 20 milliGRAM(s) Oral at bedtime  clopidogrel Tablet 75 milliGRAM(s) Oral daily  fenofibrate Tablet 145 milliGRAM(s) Oral daily  folic acid 1 milliGRAM(s) Oral daily  glucagon  Injectable 1 milliGRAM(s) IntraMuscular once  heparin   Injectable 5000 Unit(s) SubCutaneous every 12 hours  lidocaine   4% Patch 1 Patch Transdermal daily  metoprolol tartrate 12.5 milliGRAM(s) Oral every 12 hours  pantoprazole    Tablet 40 milliGRAM(s) Oral before breakfast  polyethylene glycol 3350 17 Gram(s) Oral daily  senna 2 Tablet(s) Oral at bedtime  sodium chloride 0.9% lock flush 3 milliLiter(s) IV Push every 8 hours  sodium chloride 0.9%. 1000 milliLiter(s) (75 mL/Hr) IV Continuous <Continuous>    MEDICATIONS  (PRN):       PROBLEM LIST/ ASSESSMENT:  HEALTH ISSUES - PROBLEM Dx:      ,   Patient is a 66y old  Male who presents with a chief complaint of CAD (13 Mar 2022 16:47)     s/p cardiac surgery                My plan includes :  close hemodynamic, ventilatory and drain monitoring and management per post op routine    Monitor for arrhythmias and monitor parameters for organ perfusion  beta blockade not administered due to hemodynamic instability and bradycardia  monitor neurologic status  Head of the bed should remain elevated to 45 deg .   chest PT and IS will be encouraged  monitor adequacy of oxygenation and ventilation and attempt to wean oxygen  antibiotic regimen will be tailored to the clinical, laboratory and microbiologic data  Nutritional goals will be met using po eventually , ensure adequate caloric intake and montior the same  Stress ulcer and VTE prophylaxis will be achieved    Glycemic control is satisfactory  Electrolytes have been repleted as necessary and wound care has been carried out. Pain control has been achieved.   agressive physical therapy and early mobility and ambulation goals will be met   The family was updated about the course and plan  CRITICAL CARE TIME personally provided by me  in evaluation and management, reassessments, review and interpretation of labs and x-rays, ventilator and hemodynamic management, formulating a plan and coordinating care: ___90____ MIN.  Time does not include procedural time. Time spent was non routine post-operarive caRE and included multiple and repeated evaluations at the bedside  CTICU ATTENDING     					    Elio Gutierres MD

## 2022-03-11 LAB
ALBUMIN SERPL ELPH-MCNC: 4.2 G/DL — SIGNIFICANT CHANGE UP (ref 3.3–5)
ALP SERPL-CCNC: 25 U/L — LOW (ref 40–120)
ALT FLD-CCNC: 43 U/L — SIGNIFICANT CHANGE UP (ref 10–45)
ANION GAP SERPL CALC-SCNC: 11 MMOL/L — SIGNIFICANT CHANGE UP (ref 5–17)
APTT BLD: 54.7 SEC — HIGH (ref 27.5–35.5)
AST SERPL-CCNC: 39 U/L — SIGNIFICANT CHANGE UP (ref 10–40)
BILIRUB SERPL-MCNC: 0.6 MG/DL — SIGNIFICANT CHANGE UP (ref 0.2–1.2)
BUN SERPL-MCNC: 11 MG/DL — SIGNIFICANT CHANGE UP (ref 7–23)
CALCIUM SERPL-MCNC: 9 MG/DL — SIGNIFICANT CHANGE UP (ref 8.4–10.5)
CHLORIDE SERPL-SCNC: 111 MMOL/L — HIGH (ref 96–108)
CO2 SERPL-SCNC: 20 MMOL/L — LOW (ref 22–31)
CREAT SERPL-MCNC: 1.17 MG/DL — SIGNIFICANT CHANGE UP (ref 0.5–1.3)
EGFR: 69 ML/MIN/1.73M2 — SIGNIFICANT CHANGE UP
GAS PNL BLDA: SIGNIFICANT CHANGE UP
GLUCOSE BLDC GLUCOMTR-MCNC: 102 MG/DL — HIGH (ref 70–99)
GLUCOSE BLDC GLUCOMTR-MCNC: 108 MG/DL — HIGH (ref 70–99)
GLUCOSE BLDC GLUCOMTR-MCNC: 98 MG/DL — SIGNIFICANT CHANGE UP (ref 70–99)
GLUCOSE SERPL-MCNC: 114 MG/DL — HIGH (ref 70–99)
HCT VFR BLD CALC: 33.5 % — LOW (ref 39–50)
HGB BLD-MCNC: 11.5 G/DL — LOW (ref 13–17)
INR BLD: 1.09 — SIGNIFICANT CHANGE UP (ref 0.88–1.16)
MAGNESIUM SERPL-MCNC: 2.1 MG/DL — SIGNIFICANT CHANGE UP (ref 1.6–2.6)
MCHC RBC-ENTMCNC: 31.6 PG — SIGNIFICANT CHANGE UP (ref 27–34)
MCHC RBC-ENTMCNC: 34.3 GM/DL — SIGNIFICANT CHANGE UP (ref 32–36)
MCV RBC AUTO: 92 FL — SIGNIFICANT CHANGE UP (ref 80–100)
NRBC # BLD: 0 /100 WBCS — SIGNIFICANT CHANGE UP (ref 0–0)
PHOSPHATE SERPL-MCNC: 3 MG/DL — SIGNIFICANT CHANGE UP (ref 2.5–4.5)
PLATELET # BLD AUTO: 220 K/UL — SIGNIFICANT CHANGE UP (ref 150–400)
POTASSIUM SERPL-MCNC: 4.2 MMOL/L — SIGNIFICANT CHANGE UP (ref 3.5–5.3)
POTASSIUM SERPL-SCNC: 4.2 MMOL/L — SIGNIFICANT CHANGE UP (ref 3.5–5.3)
PROT SERPL-MCNC: 6.2 G/DL — SIGNIFICANT CHANGE UP (ref 6–8.3)
PROTHROM AB SERPL-ACNC: 13 SEC — SIGNIFICANT CHANGE UP (ref 10.5–13.4)
RBC # BLD: 3.64 M/UL — LOW (ref 4.2–5.8)
RBC # FLD: 13 % — SIGNIFICANT CHANGE UP (ref 10.3–14.5)
SODIUM SERPL-SCNC: 142 MMOL/L — SIGNIFICANT CHANGE UP (ref 135–145)
WBC # BLD: 9.58 K/UL — SIGNIFICANT CHANGE UP (ref 3.8–10.5)
WBC # FLD AUTO: 9.58 K/UL — SIGNIFICANT CHANGE UP (ref 3.8–10.5)

## 2022-03-11 PROCEDURE — 93010 ELECTROCARDIOGRAM REPORT: CPT

## 2022-03-11 PROCEDURE — 71045 X-RAY EXAM CHEST 1 VIEW: CPT | Mod: 26

## 2022-03-11 RX ORDER — SODIUM CHLORIDE 9 MG/ML
3 INJECTION INTRAMUSCULAR; INTRAVENOUS; SUBCUTANEOUS EVERY 8 HOURS
Refills: 0 | Status: DISCONTINUED | OUTPATIENT
Start: 2022-03-11 | End: 2022-03-15

## 2022-03-11 RX ORDER — HEPARIN SODIUM 5000 [USP'U]/ML
5000 INJECTION INTRAVENOUS; SUBCUTANEOUS EVERY 12 HOURS
Refills: 0 | Status: DISCONTINUED | OUTPATIENT
Start: 2022-03-11 | End: 2022-03-15

## 2022-03-11 RX ORDER — ACETAMINOPHEN 500 MG
650 TABLET ORAL EVERY 6 HOURS
Refills: 0 | Status: DISCONTINUED | OUTPATIENT
Start: 2022-03-11 | End: 2022-03-13

## 2022-03-11 RX ORDER — HEPARIN SODIUM 5000 [USP'U]/ML
5000 INJECTION INTRAVENOUS; SUBCUTANEOUS EVERY 8 HOURS
Refills: 0 | Status: DISCONTINUED | OUTPATIENT
Start: 2022-03-11 | End: 2022-03-11

## 2022-03-11 RX ORDER — PANTOPRAZOLE SODIUM 20 MG/1
40 TABLET, DELAYED RELEASE ORAL
Refills: 0 | Status: DISCONTINUED | OUTPATIENT
Start: 2022-03-11 | End: 2022-03-15

## 2022-03-11 RX ORDER — METOPROLOL TARTRATE 50 MG
12.5 TABLET ORAL EVERY 12 HOURS
Refills: 0 | Status: DISCONTINUED | OUTPATIENT
Start: 2022-03-11 | End: 2022-03-15

## 2022-03-11 RX ORDER — ACETAMINOPHEN 500 MG
1000 TABLET ORAL ONCE
Refills: 0 | Status: COMPLETED | OUTPATIENT
Start: 2022-03-11 | End: 2022-03-11

## 2022-03-11 RX ORDER — OXYCODONE HYDROCHLORIDE 5 MG/1
5 TABLET ORAL EVERY 6 HOURS
Refills: 0 | Status: DISCONTINUED | OUTPATIENT
Start: 2022-03-11 | End: 2022-03-13

## 2022-03-11 RX ADMIN — Medication 145 MILLIGRAM(S): at 12:50

## 2022-03-11 RX ADMIN — Medication 1000 MILLIGRAM(S): at 04:49

## 2022-03-11 RX ADMIN — Medication 15 MILLIGRAM(S): at 09:13

## 2022-03-11 RX ADMIN — POLYETHYLENE GLYCOL 3350 17 GRAM(S): 17 POWDER, FOR SOLUTION ORAL at 12:51

## 2022-03-11 RX ADMIN — Medication 100 MILLIGRAM(S): at 22:38

## 2022-03-11 RX ADMIN — Medication 400 MILLIGRAM(S): at 17:44

## 2022-03-11 RX ADMIN — Medication 1000 MILLIGRAM(S): at 18:14

## 2022-03-11 RX ADMIN — Medication 1 MILLIGRAM(S): at 12:50

## 2022-03-11 RX ADMIN — Medication 15 MILLIGRAM(S): at 04:19

## 2022-03-11 RX ADMIN — Medication 400 MILLIGRAM(S): at 04:19

## 2022-03-11 RX ADMIN — HEPARIN SODIUM 5000 UNIT(S): 5000 INJECTION INTRAVENOUS; SUBCUTANEOUS at 05:57

## 2022-03-11 RX ADMIN — SODIUM CHLORIDE 3 MILLILITER(S): 9 INJECTION INTRAMUSCULAR; INTRAVENOUS; SUBCUTANEOUS at 21:31

## 2022-03-11 RX ADMIN — Medication 15 MILLIGRAM(S): at 03:51

## 2022-03-11 RX ADMIN — Medication 12.5 MILLIGRAM(S): at 17:09

## 2022-03-11 RX ADMIN — Medication 15 MILLIGRAM(S): at 09:38

## 2022-03-11 RX ADMIN — Medication 81 MILLIGRAM(S): at 12:51

## 2022-03-11 RX ADMIN — ATORVASTATIN CALCIUM 20 MILLIGRAM(S): 80 TABLET, FILM COATED ORAL at 22:38

## 2022-03-11 RX ADMIN — SENNA PLUS 2 TABLET(S): 8.6 TABLET ORAL at 22:38

## 2022-03-11 RX ADMIN — Medication 12.5 MILLIGRAM(S): at 12:50

## 2022-03-11 RX ADMIN — Medication 100 MILLIGRAM(S): at 05:57

## 2022-03-11 RX ADMIN — Medication 100 MILLIGRAM(S): at 15:12

## 2022-03-11 RX ADMIN — HEPARIN SODIUM 5000 UNIT(S): 5000 INJECTION INTRAVENOUS; SUBCUTANEOUS at 17:09

## 2022-03-11 RX ADMIN — SODIUM CHLORIDE 3 MILLILITER(S): 9 INJECTION INTRAMUSCULAR; INTRAVENOUS; SUBCUTANEOUS at 12:20

## 2022-03-11 RX ADMIN — PANTOPRAZOLE SODIUM 40 MILLIGRAM(S): 20 TABLET, DELAYED RELEASE ORAL at 12:50

## 2022-03-11 RX ADMIN — CLOPIDOGREL BISULFATE 75 MILLIGRAM(S): 75 TABLET, FILM COATED ORAL at 12:51

## 2022-03-11 NOTE — PROGRESS NOTE ADULT - ASSESSMENT
A/P:    66 year old male, former smoker, with PMHx of HTN, HLD, Hep C, BPH, COVID 2021, Class III Angina with known severe 2 vessel CAD s/p PCI 10/202, known to Dr. Hauser as an outpatient but cancelled surgery multiple times since October 202, who presented to [facility] ED 3/9/22 complaining of chest pain and GARCIA. He was admitted under Dr. Hauser for surgical evaluation and was deemed a good candidate for intervention.     Neurovascular:   - No delirium. Pain well controlled with current regimen.  -Continue tylenol PRN    Cardiovascular:   - POD_ s/p _  -Hemodynamically stable. HR controlled (X-X)  - Hx of HTN, BP controlled (X-X)  - ASA, Plavix, BB, statin  - EKG. TTE. Cardiac Panel. Lipid Panel. BNP.      Respiratory:   - 02 Sat = 98% on RA.  -If on oxygen wean to RA from for O2 Sat > 93%.  -Encourage C+DB and Use of IS 10x / hr while awake.  -CXR.    GI:   - Stable.  -NPO after MN.  -Continue GI PPX with protonix  -PO Diet.    Renal / :  - BUN/Cr stable at X/X  -Continue to monitor renal function.  -Monitor I/O's.  - Replete electrolytes PRN    Endocrine:    -A1c.  -TSH.    Hematologic:  -H/H stable at X/X with no obvious signs of bleeding  -Coagulation Panel.    ID:  -Pt remains afebrile with no elevation in WBC or signs of infection  -Continue to monitor CBC  -Observe for SIRS/Sepsis Syndrome.    Prophylaxis:  -DVT prophylaxis with 5000 SubQ Heparin q8h.  -SCD's    Disposition:  -Home when medically appropriate.   A/P:    66 year old male, former smoker, with PMHx of HTN, HLD, Hep C, BPH, COVID 2021, Class III Angina with known severe 2 vessel CAD s/p PCI 10/202, known to Dr. Hauser as an outpatient but cancelled surgery multiple times since October 202, who presented to [facility] ED 3/9/22 complaining of chest pain and GARCIA. He was admitted under Dr. Hauser for surgical evaluation and was deemed a good candidate for intervention. On 3/10 he underwent an uncomplicated MIDCAB (LIMA-LAD). Post operatively he was brought to CTICU extubated, in stable condition. He got out of bed to chair and became orthostatic for which he was given volume. Symptoms improved. POD1 he was de-lined and transferred to [facility] as floor care. Pleural chest tube was removed after successful clamp trial.     Neurovascular:   - No delirium. Pain well controlled with current regimen.  -Continue tylenol, oxy IR PRN    Cardiovascular:   - POD1 s/p MIDCAB (LIMA-LAD), EF 55% per fellow TTE pre op  - Planned for PCI Ramus 3/14 with Dr. Rivero  -Hemodynamically stable. HR controlled (65-90)  - Hx of HTN, BP controlled (115//57), continue metoprolol 12.5 mg q12 hours  - CAD s/p MIDCAB: continue ASA, atorvastatin 20, plavix    Respiratory:   - 02 Sat = 98% on RA.  - Pleural CT removed POD1 after clamp trial   - 1 Robe remains   -If on oxygen wean to RA from for O2 Sat > 93%.  -Encourage C+DB and Use of IS 10x / hr while awake.  -CXR without effusions, PTX    GI:   - Stable.  -Continue GI PPX with protonix  -PO Diet.    Renal / :  - BUN/Cr stable at 11/1.17  -Continue to monitor renal function.  -Monitor I/O's.  - Replete electrolytes PRN    Endocrine:    -A1c 5.6, no known hx DM, continue MISS  -TSH 0.945, no known hx thyroid disease     Hematologic:  -H/H stable at 11.5/33.5 with no obvious signs of bleeding  -Coagulation Panel.  - Continue folic acid 1 mg daily     ID:  -Pt remains afebrile with no elevation in WBC or signs of infection  -Continue to monitor CBC  -Observe for SIRS/Sepsis Syndrome.    Prophylaxis:  -DVT prophylaxis with 5000 SubQ Heparin q12 hr- lowered dose for elevated PTT  -SCD's    Disposition:  -PCI Monday with Dr. Rivero, then home

## 2022-03-11 NOTE — CHART NOTE - NSCHARTNOTEFT_GEN_A_CORE
CT Removal:    Pt seen and examined at bedside.  Case discussed with Dr. Hauser.  Minimal output from CTs.  No air leak appreciated.  CT clamed with stable clamp trial CXR. CT removed without incident per Dr. Hauser request.  Occlusive DSD placed.  CXR no obvious PTX noted.  Pt tolerated procedure well.

## 2022-03-11 NOTE — PROGRESS NOTE ADULT - SUBJECTIVE AND OBJECTIVE BOX
Patient discussed on morning rounds with Dr. Hauser    Operation / Date: 3/10 [facility] ([facility])    SUBJECTIVE ASSESSMENT:  66y Male assessed at bedside after transfer from CTICU. Patient states he feels overall well, some left chest wall pain/ soreness, worse with cough. Denies SOB, using IS and pulling 750 cc. Ambulated from CTICU with no difficulties. Denies fever, chills, nausea, vomiting.     Vital Signs Last 24 Hrs  T(C): 36.7 (11 Mar 2022 09:51), Max: 37.4 (11 Mar 2022 01:01)  T(F): 98.1 (11 Mar 2022 09:51), Max: 99.4 (11 Mar 2022 01:01)  HR: 64 (11 Mar 2022 12:08) (64 - 90)  BP: 118/56 (11 Mar 2022 12:08) (112/57 - 129/57)  BP(mean): 81 (11 Mar 2022 12:08) (77 - 84)  RR: 18 (11 Mar 2022 12:08) (16 - 20)  SpO2: 97% (11 Mar 2022 12:08) (95% - 100%)  I&O's Detail    10 Mar 2022 07:01  -  11 Mar 2022 07:00  --------------------------------------------------------  IN:    Albumin 5%  - 250 mL: 1000 mL    IV PiggyBack: 900 mL    IV PiggyBack: 300 mL    NiCARdipine: 25 mL    Oral Fluid: 200 mL    sodium chloride 0.9%: 80 mL  Total IN: 2505 mL    OUT:    Chest Tube (mL): 105 mL    Drain (mL): 175 mL    Indwelling Catheter - Urethral (mL): 2100 mL    Voided (mL): 1255 mL  Total OUT: 3635 mL    Total NET: -1130 mL      11 Mar 2022 07:01  -  11 Mar 2022 12:41  --------------------------------------------------------  IN:    Oral Fluid: 120 mL  Total IN: 120 mL    OUT:    Chest Tube (mL): 0 mL    Drain (mL): 0 mL  Total OUT: 0 mL    Total NET: 120 mL    CHEST TUBE:  No.   SHELDON DRAIN:  Yes  EPICARDIAL WIRES: No.  TIE DOWNS: Yes.  WEINSTEIN: No.    Physical Exam  CONSTITUTIONAL: Well appearing in NAD assessed laying comfortably in bed   NEURO: A&OX3. No focal deficits noted, moving bilateral upper and lower extremities                    CV: RRR, no murmurs, rubs, gallops  RESPIRATORY: Clear to auscultation bilateral posterior lung fields, no wheezes, rales, rhonchi   GI: +BS, NT/ND  MUSKULOSKELETAL: No peripheral edema or calf tenderness. Full strength and ROM bilateral upper and lower extremities   VASCULAR: Bilateral distal pulses 2+  INCISIONS: L mini thoracotomy incision clean, dry, intact     LABS:                        11.5   9.58  )-----------( 220      ( 11 Mar 2022 02:06 )             33.5       COUMADIN:  No.     PT/INR - ( 11 Mar 2022 02:06 )   PT: 13.0 sec;   INR: 1.09          PTT - ( 11 Mar 2022 02:06 )  PTT:54.7 sec    03-11    142  |  111<H>  |  11  ----------------------------<  114<H>  4.2   |  20<L>  |  1.17    Ca    9.0      11 Mar 2022 02:06  Phos  3.0     03-11  Mg     2.1     03-11    TPro  6.2  /  Alb  4.2  /  TBili  0.6  /  DBili  x   /  AST  39  /  ALT  43  /  AlkPhos  25<L>  03-11      Urinalysis Basic - ( 09 Mar 2022 23:24 )    Color: Yellow / Appearance: Clear / SG: >=1.030 / pH: x  Gluc: x / Ketone: NEGATIVE  / Bili: Negative / Urobili: 0.2 E.U./dL   Blood: x / Protein: NEGATIVE mg/dL / Nitrite: NEGATIVE   Leuk Esterase: NEGATIVE / RBC: x / WBC x   Sq Epi: x / Non Sq Epi: x / Bacteria: x    MEDICATIONS  (STANDING):  aspirin enteric coated 81 milliGRAM(s) Oral daily  atorvastatin 20 milliGRAM(s) Oral at bedtime  ceFAZolin   IVPB 2000 milliGRAM(s) IV Intermittent every 8 hours  clopidogrel Tablet 75 milliGRAM(s) Oral daily  fenofibrate Tablet 145 milliGRAM(s) Oral daily  folic acid 1 milliGRAM(s) Oral daily  glucagon  Injectable 1 milliGRAM(s) IntraMuscular once  heparin   Injectable 5000 Unit(s) SubCutaneous every 12 hours  insulin lispro (ADMELOG) corrective regimen sliding scale   SubCutaneous three times a day before meals  metoprolol tartrate 12.5 milliGRAM(s) Oral every 12 hours  pantoprazole    Tablet 40 milliGRAM(s) Oral before breakfast  polyethylene glycol 3350 17 Gram(s) Oral daily  senna 2 Tablet(s) Oral at bedtime  sodium chloride 0.9% lock flush 3 milliLiter(s) IV Push every 8 hours    MEDICATIONS  (PRN):  acetaminophen     Tablet .. 650 milliGRAM(s) Oral every 6 hours PRN Mild Pain (1 - 3)  oxyCODONE    IR 5 milliGRAM(s) Oral every 6 hours PRN Moderate Pain (4 - 6)        RADIOLOGY & ADDITIONAL TESTS:

## 2022-03-12 LAB
ANION GAP SERPL CALC-SCNC: 11 MMOL/L — SIGNIFICANT CHANGE UP (ref 5–17)
APPEARANCE UR: CLEAR — SIGNIFICANT CHANGE UP
APTT BLD: 45.3 SEC — HIGH (ref 27.5–35.5)
BILIRUB UR-MCNC: NEGATIVE — SIGNIFICANT CHANGE UP
BUN SERPL-MCNC: 16 MG/DL — SIGNIFICANT CHANGE UP (ref 7–23)
CALCIUM SERPL-MCNC: 8.9 MG/DL — SIGNIFICANT CHANGE UP (ref 8.4–10.5)
CHLORIDE SERPL-SCNC: 108 MMOL/L — SIGNIFICANT CHANGE UP (ref 96–108)
CO2 SERPL-SCNC: 21 MMOL/L — LOW (ref 22–31)
COLOR SPEC: YELLOW — SIGNIFICANT CHANGE UP
CREAT SERPL-MCNC: 1.11 MG/DL — SIGNIFICANT CHANGE UP (ref 0.5–1.3)
DIFF PNL FLD: ABNORMAL
EGFR: 73 ML/MIN/1.73M2 — SIGNIFICANT CHANGE UP
GLUCOSE BLDC GLUCOMTR-MCNC: 106 MG/DL — HIGH (ref 70–99)
GLUCOSE BLDC GLUCOMTR-MCNC: 123 MG/DL — HIGH (ref 70–99)
GLUCOSE BLDC GLUCOMTR-MCNC: 91 MG/DL — SIGNIFICANT CHANGE UP (ref 70–99)
GLUCOSE SERPL-MCNC: 101 MG/DL — HIGH (ref 70–99)
GLUCOSE UR QL: NEGATIVE — SIGNIFICANT CHANGE UP
HCT VFR BLD CALC: 38 % — LOW (ref 39–50)
HGB BLD-MCNC: 12.6 G/DL — LOW (ref 13–17)
KETONES UR-MCNC: NEGATIVE — SIGNIFICANT CHANGE UP
LEUKOCYTE ESTERASE UR-ACNC: NEGATIVE — SIGNIFICANT CHANGE UP
MAGNESIUM SERPL-MCNC: 2.2 MG/DL — SIGNIFICANT CHANGE UP (ref 1.6–2.6)
MCHC RBC-ENTMCNC: 30.7 PG — SIGNIFICANT CHANGE UP (ref 27–34)
MCHC RBC-ENTMCNC: 33.2 GM/DL — SIGNIFICANT CHANGE UP (ref 32–36)
MCV RBC AUTO: 92.7 FL — SIGNIFICANT CHANGE UP (ref 80–100)
NITRITE UR-MCNC: NEGATIVE — SIGNIFICANT CHANGE UP
NRBC # BLD: 0 /100 WBCS — SIGNIFICANT CHANGE UP (ref 0–0)
PH UR: 7 — SIGNIFICANT CHANGE UP (ref 5–8)
PLATELET # BLD AUTO: 230 K/UL — SIGNIFICANT CHANGE UP (ref 150–400)
POTASSIUM SERPL-MCNC: 3.8 MMOL/L — SIGNIFICANT CHANGE UP (ref 3.5–5.3)
POTASSIUM SERPL-SCNC: 3.8 MMOL/L — SIGNIFICANT CHANGE UP (ref 3.5–5.3)
PROT UR-MCNC: NEGATIVE MG/DL — SIGNIFICANT CHANGE UP
RBC # BLD: 4.1 M/UL — LOW (ref 4.2–5.8)
RBC # FLD: 13.3 % — SIGNIFICANT CHANGE UP (ref 10.3–14.5)
SARS-COV-2 RNA SPEC QL NAA+PROBE: SIGNIFICANT CHANGE UP
SODIUM SERPL-SCNC: 140 MMOL/L — SIGNIFICANT CHANGE UP (ref 135–145)
SP GR SPEC: 1.01 — SIGNIFICANT CHANGE UP (ref 1–1.03)
UROBILINOGEN FLD QL: 0.2 E.U./DL — SIGNIFICANT CHANGE UP
WBC # BLD: 13.77 K/UL — HIGH (ref 3.8–10.5)
WBC # FLD AUTO: 13.77 K/UL — HIGH (ref 3.8–10.5)

## 2022-03-12 PROCEDURE — 71045 X-RAY EXAM CHEST 1 VIEW: CPT | Mod: 26

## 2022-03-12 RX ORDER — POTASSIUM CHLORIDE 20 MEQ
40 PACKET (EA) ORAL ONCE
Refills: 0 | Status: COMPLETED | OUTPATIENT
Start: 2022-03-12 | End: 2022-03-12

## 2022-03-12 RX ORDER — IBUPROFEN 200 MG
400 TABLET ORAL ONCE
Refills: 0 | Status: COMPLETED | OUTPATIENT
Start: 2022-03-12 | End: 2022-03-12

## 2022-03-12 RX ORDER — ACETYLCYSTEINE 200 MG/ML
4 VIAL (ML) MISCELLANEOUS ONCE
Refills: 0 | Status: COMPLETED | OUTPATIENT
Start: 2022-03-12 | End: 2022-03-12

## 2022-03-12 RX ADMIN — OXYCODONE HYDROCHLORIDE 5 MILLIGRAM(S): 5 TABLET ORAL at 17:55

## 2022-03-12 RX ADMIN — OXYCODONE HYDROCHLORIDE 5 MILLIGRAM(S): 5 TABLET ORAL at 09:42

## 2022-03-12 RX ADMIN — SENNA PLUS 2 TABLET(S): 8.6 TABLET ORAL at 21:50

## 2022-03-12 RX ADMIN — Medication 650 MILLIGRAM(S): at 13:05

## 2022-03-12 RX ADMIN — OXYCODONE HYDROCHLORIDE 5 MILLIGRAM(S): 5 TABLET ORAL at 10:40

## 2022-03-12 RX ADMIN — SODIUM CHLORIDE 3 MILLILITER(S): 9 INJECTION INTRAMUSCULAR; INTRAVENOUS; SUBCUTANEOUS at 06:23

## 2022-03-12 RX ADMIN — Medication 12.5 MILLIGRAM(S): at 06:06

## 2022-03-12 RX ADMIN — Medication 145 MILLIGRAM(S): at 11:03

## 2022-03-12 RX ADMIN — Medication 4 MILLILITER(S): at 16:51

## 2022-03-12 RX ADMIN — Medication 81 MILLIGRAM(S): at 11:03

## 2022-03-12 RX ADMIN — Medication 650 MILLIGRAM(S): at 12:10

## 2022-03-12 RX ADMIN — Medication 12.5 MILLIGRAM(S): at 17:36

## 2022-03-12 RX ADMIN — CLOPIDOGREL BISULFATE 75 MILLIGRAM(S): 75 TABLET, FILM COATED ORAL at 11:03

## 2022-03-12 RX ADMIN — Medication 400 MILLIGRAM(S): at 15:45

## 2022-03-12 RX ADMIN — HEPARIN SODIUM 5000 UNIT(S): 5000 INJECTION INTRAVENOUS; SUBCUTANEOUS at 17:36

## 2022-03-12 RX ADMIN — OXYCODONE HYDROCHLORIDE 5 MILLIGRAM(S): 5 TABLET ORAL at 03:49

## 2022-03-12 RX ADMIN — Medication 40 MILLIEQUIVALENT(S): at 08:28

## 2022-03-12 RX ADMIN — SODIUM CHLORIDE 3 MILLILITER(S): 9 INJECTION INTRAMUSCULAR; INTRAVENOUS; SUBCUTANEOUS at 21:48

## 2022-03-12 RX ADMIN — POLYETHYLENE GLYCOL 3350 17 GRAM(S): 17 POWDER, FOR SOLUTION ORAL at 11:04

## 2022-03-12 RX ADMIN — Medication 650 MILLIGRAM(S): at 06:36

## 2022-03-12 RX ADMIN — ATORVASTATIN CALCIUM 20 MILLIGRAM(S): 80 TABLET, FILM COATED ORAL at 21:50

## 2022-03-12 RX ADMIN — SODIUM CHLORIDE 3 MILLILITER(S): 9 INJECTION INTRAMUSCULAR; INTRAVENOUS; SUBCUTANEOUS at 15:12

## 2022-03-12 RX ADMIN — Medication 1 MILLIGRAM(S): at 11:03

## 2022-03-12 RX ADMIN — OXYCODONE HYDROCHLORIDE 5 MILLIGRAM(S): 5 TABLET ORAL at 23:23

## 2022-03-12 RX ADMIN — HEPARIN SODIUM 5000 UNIT(S): 5000 INJECTION INTRAVENOUS; SUBCUTANEOUS at 06:06

## 2022-03-12 RX ADMIN — PANTOPRAZOLE SODIUM 40 MILLIGRAM(S): 20 TABLET, DELAYED RELEASE ORAL at 06:06

## 2022-03-12 RX ADMIN — Medication 650 MILLIGRAM(S): at 21:54

## 2022-03-12 RX ADMIN — OXYCODONE HYDROCHLORIDE 5 MILLIGRAM(S): 5 TABLET ORAL at 16:51

## 2022-03-12 RX ADMIN — OXYCODONE HYDROCHLORIDE 5 MILLIGRAM(S): 5 TABLET ORAL at 22:53

## 2022-03-12 RX ADMIN — OXYCODONE HYDROCHLORIDE 5 MILLIGRAM(S): 5 TABLET ORAL at 04:30

## 2022-03-12 RX ADMIN — Medication 650 MILLIGRAM(S): at 06:06

## 2022-03-12 RX ADMIN — Medication 400 MILLIGRAM(S): at 14:30

## 2022-03-12 RX ADMIN — Medication 650 MILLIGRAM(S): at 22:24

## 2022-03-12 NOTE — PROGRESS NOTE ADULT - SUBJECTIVE AND OBJECTIVE BOX
Patient discussed on morning rounds with Dr. Hauser     Operation / Date: 3/10 MIDCAB (LIMA-LAD)    SUBJECTIVE ASSESSMENT:  66y Male seen and examined at bedside. Patient c/o incisional cp and tenderness at porfirio site. Patient denies sob, palpitations, n/v/d/c. Patient febrile to 102 today, asymptomatic.     Vital Signs Last 24 Hrs  T(C): 37.2 (12 Mar 2022 09:00), Max: 37.8 (12 Mar 2022 05:40)  T(F): 98.9 (12 Mar 2022 09:00), Max: 100 (12 Mar 2022 05:40)  HR: 73 (12 Mar 2022 13:26) (62 - 77)  BP: 163/78 (12 Mar 2022 13:26) (101/54 - 163/78)  BP(mean): 112 (12 Mar 2022 13:26) (74 - 112)  RR: 20 (12 Mar 2022 13:26) (15 - 20)  SpO2: 94% (12 Mar 2022 13:26) (90% - 97%)  I&O's Detail    11 Mar 2022 07:01  -  12 Mar 2022 07:00  --------------------------------------------------------  IN:    IV PiggyBack: 100 mL    Oral Fluid: 120 mL  Total IN: 220 mL    OUT:    Chest Tube (mL): 150 mL    Drain (mL): 0 mL    Voided (mL): 600 mL  Total OUT: 750 mL    Total NET: -530 mL      CHEST TUBE:  No.   PORFIRIO DRAIN:  Yes.  EPICARDIAL WIRES: No.  TIE DOWNS: Yes.  WEINSTEIN: No.    PHYSICAL EXAM:  GEN: NAD, looks comfortable  Psych: Mood appropriate  Neuro: A&Ox3.  No focal deficits.  Moving all extremities.   HEENT: No obvious abnormalities  CV: S1S2, regular, no murmurs appreciated.  No carotid bruits.  No JVD  Lungs: Clear B/L.  No wheezing, rales or rhonchi  ABD: Soft, non-tender, non-distended.  +Bowel sounds  EXT: Warm and well perfused.  No peripheral edema noted  Musculoskeletal: Moving all extremities with normal ROM, no joint swelling  PV: Pedal pulses palpable  Incision Sites: left mini thoracotomy site c/d/i; porfirio covered with clean dressing    LABS:                        12.6   13.77 )-----------( 230      ( 12 Mar 2022 06:26 )             38.0       COUMADIN:  No.     PT/INR - ( 11 Mar 2022 02:06 )   PT: 13.0 sec;   INR: 1.09          PTT - ( 12 Mar 2022 06:26 )  PTT:45.3 sec    03-12    140  |  108  |  16  ----------------------------<  101<H>  3.8   |  21<L>  |  1.11    Ca    8.9      12 Mar 2022 06:26  Phos  3.0     03-11  Mg     2.2     03-12    TPro  6.2  /  Alb  4.2  /  TBili  0.6  /  DBili  x   /  AST  39  /  ALT  43  /  AlkPhos  25<L>  03-11      MEDICATIONS  (STANDING):  aspirin enteric coated 81 milliGRAM(s) Oral daily  atorvastatin 20 milliGRAM(s) Oral at bedtime  clopidogrel Tablet 75 milliGRAM(s) Oral daily  fenofibrate Tablet 145 milliGRAM(s) Oral daily  folic acid 1 milliGRAM(s) Oral daily  glucagon  Injectable 1 milliGRAM(s) IntraMuscular once  heparin   Injectable 5000 Unit(s) SubCutaneous every 12 hours  insulin lispro (ADMELOG) corrective regimen sliding scale   SubCutaneous three times a day before meals  metoprolol tartrate 12.5 milliGRAM(s) Oral every 12 hours  pantoprazole    Tablet 40 milliGRAM(s) Oral before breakfast  polyethylene glycol 3350 17 Gram(s) Oral daily  senna 2 Tablet(s) Oral at bedtime  sodium chloride 0.9% lock flush 3 milliLiter(s) IV Push every 8 hours    MEDICATIONS  (PRN):  acetaminophen     Tablet .. 650 milliGRAM(s) Oral every 6 hours PRN Mild Pain (1 - 3)  oxyCODONE    IR 5 milliGRAM(s) Oral every 6 hours PRN Moderate Pain (4 - 6)      RADIOLOGY & ADDITIONAL TESTS:  < from: Xray Chest 1 View- PORTABLE-Routine (Xray Chest 1 View- PORTABLE-Routine in AM.) (03.12.22 @ 05:07) >    INTERPRETATION:  Clinical History: Chest pain    Frontal examination of the chest demonstrates the heart to be within   normal limits intransverse diameter. No interval change this remaining   support devices in comparison to prior examination of the chest   3/11/2022. Elevation right hemidiaphragm. Discoid change and/or   infiltrate left lung base.    IMPRESSION: Discoid change and/or infiltrate left lung base    < end of copied text >     Patient discussed on morning rounds with Dr. Hauser     Operation / Date: 3/10 MIDCAB (LIMA-LAD)    SUBJECTIVE ASSESSMENT:  66y Male seen and examined at bedside. Patient c/o incisional cp and tenderness at porfirio site. Patient denies sob, palpitations, n/v/d/c. Patient febrile to 102 today, asymptomatic.     Vital Signs Last 24 Hrs  T(C): 37.2 (12 Mar 2022 09:00), Max: 37.8 (12 Mar 2022 05:40)  T(F): 98.9 (12 Mar 2022 09:00), Max: 100 (12 Mar 2022 05:40)  HR: 73 (12 Mar 2022 13:26) (62 - 77)  BP: 163/78 (12 Mar 2022 13:26) (101/54 - 163/78)  BP(mean): 112 (12 Mar 2022 13:26) (74 - 112)  RR: 20 (12 Mar 2022 13:26) (15 - 20)  SpO2: 94% (12 Mar 2022 13:26) (90% - 97%)  I&O's Detail    11 Mar 2022 07:01  -  12 Mar 2022 07:00  --------------------------------------------------------  IN:    IV PiggyBack: 100 mL    Oral Fluid: 120 mL  Total IN: 220 mL    OUT:    Chest Tube (mL): 150 mL    Drain (mL): 0 mL    Voided (mL): 600 mL  Total OUT: 750 mL    Total NET: -530 mL    CHEST TUBE:  No.   PORFIRIO DRAIN:  Yes.  EPICARDIAL WIRES: No.  TIE DOWNS: Yes.  WEINSTEIN: No.    PHYSICAL EXAM:  GEN: NAD, looks comfortable  Psych: Mood appropriate  Neuro: A&Ox3.  No focal deficits.  Moving all extremities.   HEENT: No obvious abnormalities  CV: S1S2, regular, no murmurs appreciated.  No carotid bruits.  No JVD  Lungs: Clear B/L. No wheezing, rales or rhonchi  ABD: Soft, non-tender, non-distended.  +Bowel sounds  EXT: Warm and well perfused. No peripheral edema noted  Musculoskeletal: Moving all extremities with normal ROM, no joint swelling  PV: Pedal pulses palpable  Incision Sites: left mini thoracotomy site c/d/i; porfirio covered with clean dressing    LABS:                        12.6   13.77 )-----------( 230      ( 12 Mar 2022 06:26 )             38.0     COUMADIN:  No.     PT/INR - ( 11 Mar 2022 02:06 )   PT: 13.0 sec;   INR: 1.09          PTT - ( 12 Mar 2022 06:26 )  PTT:45.3 sec    03-12    140  |  108  |  16  ----------------------------<  101<H>  3.8   |  21<L>  |  1.11    Ca    8.9      12 Mar 2022 06:26  Phos  3.0     03-11  Mg     2.2     03-12    TPro  6.2  /  Alb  4.2  /  TBili  0.6  /  DBili  x   /  AST  39  /  ALT  43  /  AlkPhos  25<L>  03-11      MEDICATIONS  (STANDING):  aspirin enteric coated 81 milliGRAM(s) Oral daily  atorvastatin 20 milliGRAM(s) Oral at bedtime  clopidogrel Tablet 75 milliGRAM(s) Oral daily  fenofibrate Tablet 145 milliGRAM(s) Oral daily  folic acid 1 milliGRAM(s) Oral daily  glucagon  Injectable 1 milliGRAM(s) IntraMuscular once  heparin   Injectable 5000 Unit(s) SubCutaneous every 12 hours  insulin lispro (ADMELOG) corrective regimen sliding scale   SubCutaneous three times a day before meals  metoprolol tartrate 12.5 milliGRAM(s) Oral every 12 hours  pantoprazole    Tablet 40 milliGRAM(s) Oral before breakfast  polyethylene glycol 3350 17 Gram(s) Oral daily  senna 2 Tablet(s) Oral at bedtime  sodium chloride 0.9% lock flush 3 milliLiter(s) IV Push every 8 hours    MEDICATIONS  (PRN):  acetaminophen     Tablet .. 650 milliGRAM(s) Oral every 6 hours PRN Mild Pain (1 - 3)  oxyCODONE    IR 5 milliGRAM(s) Oral every 6 hours PRN Moderate Pain (4 - 6)      RADIOLOGY & ADDITIONAL TESTS:  < from: Xray Chest 1 View- PORTABLE-Routine (Xray Chest 1 View- PORTABLE-Routine in AM.) (03.12.22 @ 05:07) >    INTERPRETATION:  Clinical History: Chest pain    Frontal examination of the chest demonstrates the heart to be within   normal limits intransverse diameter. No interval change this remaining   support devices in comparison to prior examination of the chest   3/11/2022. Elevation right hemidiaphragm. Discoid change and/or   infiltrate left lung base.    IMPRESSION: Discoid change and/or infiltrate left lung base    < end of copied text >

## 2022-03-12 NOTE — PROGRESS NOTE ADULT - ASSESSMENT
66 year old male, former smoker, with PMHx of HTN, HLD, Hep C, BPH, COVID 2021, Class III Angina with known severe 2 vessel CAD s/p PCI 10/202, known to Dr. Hauser as an outpatient but cancelled surgery multiple times since October 202, who presented to [facility] ED 3/9/22 complaining of chest pain and GARCIA. He was admitted under Dr. Hauser for surgical evaluation and was deemed a good candidate for intervention. On 3/10 he underwent an uncomplicated MIDCAB (LIMA-LAD). Post operatively he was brought to CTICU extubated, in stable condition. He got out of bed to chair and became orthostatic for which he was given volume. Symptoms improved. POD1 he was de-lined and transferred to [facility] as floor care. Pleural chest tube was removed after successful clamp trial. POD2      Neurovascular:   - No delirium. Pain well controlled with current regimen.  -Continue tylenol, oxy IR PRN    Cardiovascular:   - POD2 s/p MIDCAB (LIMA-LAD), EF 55% per fellow TTE pre op  - Planned for PCI Ramus 3/14 with Dr. Rivero  -Hemodynamically stable. HR controlled (65-90)  - Hx of HTN, BP controlled (115//57), continue metoprolol 12.5 mg q12 hours  - CAD s/p MIDCAB: continue ASA, atorvastatin 20, plavix    Respiratory:   -02 Sat = 98% on RA.  -Pleural CT removed POD1 after clamp trial   -1 Robe remains   -If on oxygen wean to RA from for O2 Sat > 93%.  -Encourage C+DB and Use of IS 10x / hr while awake.  -CXR without effusions, PTX    GI:   - Stable.  -Continue GI PPX with protonix  -PO Diet.    Renal / :  -BUN/Cr stable at 16/1.11  -Continue to monitor renal function.  -Monitor I/O's.  - Replete electrolytes PRN    Endocrine:    -A1c 5.6, no known hx DM, continue MISS  -TSH 0.945, no known hx thyroid disease     Hematologic:  -H/H stable at 12.6/38.0 with no obvious signs of bleeding  -Coagulation Panel.  -Continue folic acid 1 mg daily     ID:  -Pt remains afebrile with no elevation in WBC or signs of infection  -WBC 13.77 (3/11 - 9.6)  -Continue to monitor CBC  -Observe for SIRS/Sepsis Syndrome.    Prophylaxis:  -DVT prophylaxis with 5000 SubQ Heparin q12 hr- lowered dose for elevated PTT  -SCD's    Disposition:  -PCI Monday with Dr. Rivero, then home    66 year old male, former smoker, with PMHx of HTN, HLD, Hep C, BPH, COVID 2021, Class III Angina with known severe 2 vessel CAD s/p PCI 10/202, known to Dr. Hauser as an outpatient but cancelled surgery multiple times since October 202, who presented to [facility] ED 3/9/22 complaining of chest pain and GARCIA. He was admitted under Dr. Hauser for surgical evaluation and was deemed a good candidate for intervention. On 3/10 he underwent an uncomplicated MIDCAB (LIMA-LAD). Post operatively he was brought to CTICU extubated, in stable condition. He got out of bed to chair and became orthostatic for which he was given volume. Symptoms improved. POD1 he was de-lined and transferred to [facility] as floor care. Pleural chest tube was removed after successful clamp trial. POD2 patient febrile, asymptomatic. Patient undergoing clamp trial for removal of mediastinal porfirio today.     Neurovascular:   -No delirium. Pain well controlled with current regimen.  -Continue tylenol, oxy IR PRN    Cardiovascular:   -POD2 s/p MIDCAB (LIMA-LAD), EF 55% per fellow TTE pre op  -Planned for PCI Ramus 3/14 with Dr. Rivero  -Hemodynamically stable. HR controlled (65-90)  -Hx of HTN, BP controlled (115//57), continue metoprolol 12.5 mg q12 hours  -CAD s/p MIDCAB: continue ASA, atorvastatin 20, plavix    Respiratory:   -02 Sat = 98% on RA  -Pleural CT removed POD1 after clamp trial   -1 Porfirio remains - clamp trial today  -If on oxygen wean to RA from for O2 Sat > 93%.  -Encourage C+DB and Use of IS 10x / hr while awake.  -CXR without effusions, PTX    GI:   - Stable.  -Continue GI PPX with protonix  -PO Diet.    Renal / :  -BUN/Cr stable at 16/1.11  -Continue to monitor renal function.  -Monitor I/O's.  - Replete electrolytes PRN    Endocrine:    -A1c 5.6, no known hx DM, continue MISS  -TSH 0.945, no known hx thyroid disease     Hematologic:  -H/H stable at 12.6/38.0 with no obvious signs of bleeding  -Coagulation Panel.  -Continue folic acid 1 mg daily     ID:  -Pt remains afebrile with no elevation in WBC or signs of infection  -WBC 13.77 (3/11 - 9.6)  -Continue to monitor CBC  -Observe for SIRS/Sepsis Syndrome.    Prophylaxis:  -DVT prophylaxis with 5000 SubQ Heparin q12 hr- lowered dose for elevated PTT  -SCD's    Disposition:  -PCI Monday with Dr. Rivero, then home    66 year old male, former smoker, with PMHx of HTN, HLD, Hep C, BPH, COVID 2021, Class III Angina with known severe 2 vessel CAD s/p PCI 10/202, known to Dr. Hauser as an outpatient but cancelled surgery multiple times since October 202, who presented to [facility] ED 3/9/22 complaining of chest pain and GARCIA. He was admitted under Dr. Hauser for surgical evaluation and was deemed a good candidate for intervention. On 3/10 he underwent an uncomplicated MIDCAB (LIMA-LAD). Post operatively he was brought to CTICU extubated, in stable condition. He got out of bed to chair and became orthostatic for which he was given volume. Symptoms improved. POD1 he was de-lined and transferred to [facility] as floor care. Pleural chest tube was removed after successful clamp trial. POD2 patient febrile, asymptomatic. Patient undergoing clamp trial for removal of mediastinal porfirio today.     Neurovascular:   -No delirium. Pain well controlled with current regimen.  -Continue tylenol, oxy IR PRN    Cardiovascular:   -POD2 s/p MIDCAB (LIMA-LAD), EF 55% per fellow TTE pre op  -Planned for PCI Ramus 3/14 with Dr. Rivero  -Hemodynamically stable. HR controlled (65-90)  -Hx of HTN, BP controlled (115//57), continue metoprolol 12.5 mg q12 hours  -CAD s/p MIDCAB: continue ASA, atorvastatin 20, plavix    Respiratory:   -02 Sat = 98% on RA  -Pleural CT removed POD1 after clamp trial   -1 Porfirio remains - clamp trial today  -If on oxygen wean to RA from for O2 Sat > 93%.  -Encourage C+DB and Use of IS 10x / hr while awake.  -CXR without effusions, PTX - possible consolidation    GI:   -Stable.  -Continue GI PPX with protonix  -PO Diet.    Renal / :  -BUN/Cr stable at 16/1.11  -Continue to monitor renal function.  -Monitor I/O's.  - Replete electrolytes PRN    Endocrine:    -A1c 5.6, no known hx DM, continue MISS  -TSH 0.945, no known hx thyroid disease     Hematologic:  -H/H stable at 12.6/38.0 with no obvious signs of bleeding  -Coagulation Panel.  -Continue folic acid 1 mg daily     ID:  -WBC 13.77 (3/11 - 9.6) uptrending  -UA negative  -Febrile to 101.6  -Continue to monitor CBC  -Observe for SIRS/Sepsis Syndrome  -Obtain new COVID swab for PCI Monday    Prophylaxis:  -DVT prophylaxis with 5000 SubQ Heparin q12 hr- lowered dose for elevated PTT  -SCD's    Disposition:  -PCI Monday with Dr. Rivero, then home

## 2022-03-13 LAB
ALBUMIN SERPL ELPH-MCNC: 3.8 G/DL — SIGNIFICANT CHANGE UP (ref 3.3–5)
ALP SERPL-CCNC: 42 U/L — SIGNIFICANT CHANGE UP (ref 40–120)
ALT FLD-CCNC: 38 U/L — SIGNIFICANT CHANGE UP (ref 10–45)
ANION GAP SERPL CALC-SCNC: 13 MMOL/L — SIGNIFICANT CHANGE UP (ref 5–17)
AST SERPL-CCNC: 48 U/L — HIGH (ref 10–40)
BILIRUB SERPL-MCNC: 0.9 MG/DL — SIGNIFICANT CHANGE UP (ref 0.2–1.2)
BUN SERPL-MCNC: 15 MG/DL — SIGNIFICANT CHANGE UP (ref 7–23)
CALCIUM SERPL-MCNC: 9 MG/DL — SIGNIFICANT CHANGE UP (ref 8.4–10.5)
CHLORIDE SERPL-SCNC: 105 MMOL/L — SIGNIFICANT CHANGE UP (ref 96–108)
CO2 SERPL-SCNC: 21 MMOL/L — LOW (ref 22–31)
CREAT SERPL-MCNC: 1.07 MG/DL — SIGNIFICANT CHANGE UP (ref 0.5–1.3)
EGFR: 77 ML/MIN/1.73M2 — SIGNIFICANT CHANGE UP
GLUCOSE BLDC GLUCOMTR-MCNC: 104 MG/DL — HIGH (ref 70–99)
GLUCOSE BLDC GLUCOMTR-MCNC: 150 MG/DL — HIGH (ref 70–99)
GLUCOSE SERPL-MCNC: 100 MG/DL — HIGH (ref 70–99)
HCT VFR BLD CALC: 40.2 % — SIGNIFICANT CHANGE UP (ref 39–50)
HGB BLD-MCNC: 13.4 G/DL — SIGNIFICANT CHANGE UP (ref 13–17)
MCHC RBC-ENTMCNC: 31.3 PG — SIGNIFICANT CHANGE UP (ref 27–34)
MCHC RBC-ENTMCNC: 33.3 GM/DL — SIGNIFICANT CHANGE UP (ref 32–36)
MCV RBC AUTO: 93.9 FL — SIGNIFICANT CHANGE UP (ref 80–100)
NRBC # BLD: 0 /100 WBCS — SIGNIFICANT CHANGE UP (ref 0–0)
PLATELET # BLD AUTO: 232 K/UL — SIGNIFICANT CHANGE UP (ref 150–400)
POTASSIUM SERPL-MCNC: 4 MMOL/L — SIGNIFICANT CHANGE UP (ref 3.5–5.3)
POTASSIUM SERPL-SCNC: 4 MMOL/L — SIGNIFICANT CHANGE UP (ref 3.5–5.3)
PROT SERPL-MCNC: 6.9 G/DL — SIGNIFICANT CHANGE UP (ref 6–8.3)
RBC # BLD: 4.28 M/UL — SIGNIFICANT CHANGE UP (ref 4.2–5.8)
RBC # FLD: 13.2 % — SIGNIFICANT CHANGE UP (ref 10.3–14.5)
SODIUM SERPL-SCNC: 139 MMOL/L — SIGNIFICANT CHANGE UP (ref 135–145)
WBC # BLD: 12.99 K/UL — HIGH (ref 3.8–10.5)
WBC # FLD AUTO: 12.99 K/UL — HIGH (ref 3.8–10.5)

## 2022-03-13 PROCEDURE — 71045 X-RAY EXAM CHEST 1 VIEW: CPT | Mod: 26

## 2022-03-13 PROCEDURE — 93010 ELECTROCARDIOGRAM REPORT: CPT

## 2022-03-13 RX ORDER — CHLORHEXIDINE GLUCONATE 213 G/1000ML
1 SOLUTION TOPICAL ONCE
Refills: 0 | Status: DISCONTINUED | OUTPATIENT
Start: 2022-03-14 | End: 2022-03-15

## 2022-03-13 RX ORDER — KETOROLAC TROMETHAMINE 30 MG/ML
15 SYRINGE (ML) INJECTION ONCE
Refills: 0 | Status: DISCONTINUED | OUTPATIENT
Start: 2022-03-13 | End: 2022-03-13

## 2022-03-13 RX ORDER — SODIUM CHLORIDE 9 MG/ML
1000 INJECTION INTRAMUSCULAR; INTRAVENOUS; SUBCUTANEOUS
Refills: 0 | Status: DISCONTINUED | OUTPATIENT
Start: 2022-03-13 | End: 2022-03-15

## 2022-03-13 RX ORDER — LIDOCAINE 4 G/100G
1 CREAM TOPICAL DAILY
Refills: 0 | Status: DISCONTINUED | OUTPATIENT
Start: 2022-03-13 | End: 2022-03-15

## 2022-03-13 RX ORDER — ACETAMINOPHEN 500 MG
650 TABLET ORAL EVERY 6 HOURS
Refills: 0 | Status: DISCONTINUED | OUTPATIENT
Start: 2022-03-13 | End: 2022-03-15

## 2022-03-13 RX ADMIN — OXYCODONE HYDROCHLORIDE 5 MILLIGRAM(S): 5 TABLET ORAL at 09:01

## 2022-03-13 RX ADMIN — LIDOCAINE 1 PATCH: 4 CREAM TOPICAL at 21:40

## 2022-03-13 RX ADMIN — HEPARIN SODIUM 5000 UNIT(S): 5000 INJECTION INTRAVENOUS; SUBCUTANEOUS at 07:27

## 2022-03-13 RX ADMIN — Medication 12.5 MILLIGRAM(S): at 07:27

## 2022-03-13 RX ADMIN — Medication 650 MILLIGRAM(S): at 19:52

## 2022-03-13 RX ADMIN — Medication 15 MILLIGRAM(S): at 11:37

## 2022-03-13 RX ADMIN — Medication 1 MILLIGRAM(S): at 11:09

## 2022-03-13 RX ADMIN — Medication 650 MILLIGRAM(S): at 19:09

## 2022-03-13 RX ADMIN — HEPARIN SODIUM 5000 UNIT(S): 5000 INJECTION INTRAVENOUS; SUBCUTANEOUS at 17:45

## 2022-03-13 RX ADMIN — Medication 650 MILLIGRAM(S): at 09:01

## 2022-03-13 RX ADMIN — PANTOPRAZOLE SODIUM 40 MILLIGRAM(S): 20 TABLET, DELAYED RELEASE ORAL at 07:27

## 2022-03-13 RX ADMIN — POLYETHYLENE GLYCOL 3350 17 GRAM(S): 17 POWDER, FOR SOLUTION ORAL at 11:08

## 2022-03-13 RX ADMIN — SENNA PLUS 2 TABLET(S): 8.6 TABLET ORAL at 21:35

## 2022-03-13 RX ADMIN — LIDOCAINE 1 PATCH: 4 CREAM TOPICAL at 11:09

## 2022-03-13 RX ADMIN — SODIUM CHLORIDE 3 MILLILITER(S): 9 INJECTION INTRAMUSCULAR; INTRAVENOUS; SUBCUTANEOUS at 21:40

## 2022-03-13 RX ADMIN — CLOPIDOGREL BISULFATE 75 MILLIGRAM(S): 75 TABLET, FILM COATED ORAL at 11:08

## 2022-03-13 RX ADMIN — ATORVASTATIN CALCIUM 20 MILLIGRAM(S): 80 TABLET, FILM COATED ORAL at 21:35

## 2022-03-13 RX ADMIN — Medication 145 MILLIGRAM(S): at 11:08

## 2022-03-13 RX ADMIN — OXYCODONE HYDROCHLORIDE 5 MILLIGRAM(S): 5 TABLET ORAL at 07:27

## 2022-03-13 RX ADMIN — Medication 15 MILLIGRAM(S): at 10:28

## 2022-03-13 RX ADMIN — LIDOCAINE 1 PATCH: 4 CREAM TOPICAL at 19:07

## 2022-03-13 RX ADMIN — SODIUM CHLORIDE 75 MILLILITER(S): 9 INJECTION INTRAMUSCULAR; INTRAVENOUS; SUBCUTANEOUS at 19:24

## 2022-03-13 RX ADMIN — SODIUM CHLORIDE 3 MILLILITER(S): 9 INJECTION INTRAMUSCULAR; INTRAVENOUS; SUBCUTANEOUS at 14:24

## 2022-03-13 RX ADMIN — Medication 650 MILLIGRAM(S): at 06:31

## 2022-03-13 RX ADMIN — Medication 650 MILLIGRAM(S): at 14:24

## 2022-03-13 RX ADMIN — SODIUM CHLORIDE 3 MILLILITER(S): 9 INJECTION INTRAMUSCULAR; INTRAVENOUS; SUBCUTANEOUS at 07:24

## 2022-03-13 RX ADMIN — Medication 650 MILLIGRAM(S): at 12:51

## 2022-03-13 RX ADMIN — Medication 12.5 MILLIGRAM(S): at 17:40

## 2022-03-13 RX ADMIN — Medication 81 MILLIGRAM(S): at 11:08

## 2022-03-13 NOTE — PROGRESS NOTE ADULT - SUBJECTIVE AND OBJECTIVE BOX
Patient discussed on morning rounds with Dr. Hauser      Operation / Date: 3/10 MIDCAB (LIMA-LAD); PCI tomorrow w/Dr. Rivero    SUBJECTIVE ASSESSMENT:  66y Male seen and examined at bedside. DYLON. Patient c/o pain, seems to be waiting until pain is very bad before asking for any medication from the nurse. Patient also febrile overnight to 101.4, fever workup negative. Patient otherwise clinically stable, no other complaints at this time.    Vital Signs Last 24 Hrs  T(C): 37.6 (13 Mar 2022 14:00), Max: 38.7 (12 Mar 2022 15:00)  T(F): 99.7 (13 Mar 2022 14:00), Max: 101.6 (12 Mar 2022 15:00)  HR: 76 (13 Mar 2022 13:27) (64 - 80)  BP: 132/69 (13 Mar 2022 13:27) (110/63 - 140/82)  BP(mean): 95 (13 Mar 2022 13:27) (79 - 105)  RR: 20 (13 Mar 2022 13:27) (15 - 20)  SpO2: 96% (13 Mar 2022 13:27) (94% - 96%)  I&O's Detail    12 Mar 2022 06:01  -  13 Mar 2022 07:00  --------------------------------------------------------  IN:  Total IN: 0 mL    OUT:    Voided (mL): 850 mL  Total OUT: 850 mL    Total NET: -850 mL      13 Mar 2022 07:01  -  13 Mar 2022 14:31  --------------------------------------------------------  IN:  Total IN: 0 mL    OUT:    Voided (mL): 450 mL  Total OUT: 450 mL    Total NET: -450 mL      CHEST TUBE:   No.    SHELDON DRAIN:   No.  EPICARDIAL WIRES: No.  TIE DOWNS:  No.  WEINSTEIN: No.    PHYSICAL EXAM:  GEN: NAD, looks comfortable  Psych: Mood appropriate  Neuro: A&Ox3.  No focal deficits.  Moving all extremities.   HEENT: No obvious abnormalities  CV: S1S2, regular, no murmurs appreciated.  No carotid bruits.  No JVD  Lungs: Clear B/L.  No wheezing, rales or rhonchi  ABD: Soft, non-tender, non-distended.  +Bowel sounds  EXT: Warm and well perfused.  No peripheral edema noted  Musculoskeletal: Moving all extremities with normal ROM, no joint swelling  PV: Pedal pulses palpable  Incision Sites: left mini thoracotomy site c/d/i    LABS:                        13.4   12. )-----------( 232      ( 13 Mar 2022 05:44 )             40.2       COUMADIN:  No.     PTT - ( 12 Mar 2022 06:26 )  PTT:45.3 sec        139  |  105  |  15  ----------------------------<  100<H>  4.0   |  21<L>  |  1.07    Ca    9.0      13 Mar 2022 05:44  Mg     2.2     -    TPro  6.9  /  Alb  3.8  /  TBili  0.9  /  DBili  x   /  AST  48<H>  /  ALT  38  /  AlkPhos  42  -13      Urinalysis Basic - ( 12 Mar 2022 16:16 )    Color: Yellow / Appearance: Clear / S.015 / pH: x  Gluc: x / Ketone: NEGATIVE  / Bili: Negative / Urobili: 0.2 E.U./dL   Blood: x / Protein: NEGATIVE mg/dL / Nitrite: NEGATIVE   Leuk Esterase: NEGATIVE / RBC: < 5 /HPF / WBC < 5 /HPF   Sq Epi: x / Non Sq Epi: 0-5 /HPF / Bacteria: Present /HPF    MEDICATIONS  (STANDING):  acetaminophen     Tablet .. 650 milliGRAM(s) Oral every 6 hours  aspirin enteric coated 81 milliGRAM(s) Oral daily  atorvastatin 20 milliGRAM(s) Oral at bedtime  clopidogrel Tablet 75 milliGRAM(s) Oral daily  fenofibrate Tablet 145 milliGRAM(s) Oral daily  folic acid 1 milliGRAM(s) Oral daily  glucagon  Injectable 1 milliGRAM(s) IntraMuscular once  heparin   Injectable 5000 Unit(s) SubCutaneous every 12 hours  insulin lispro (ADMELOG) corrective regimen sliding scale   SubCutaneous three times a day before meals  lidocaine   4% Patch 1 Patch Transdermal daily  metoprolol tartrate 12.5 milliGRAM(s) Oral every 12 hours  pantoprazole    Tablet 40 milliGRAM(s) Oral before breakfast  polyethylene glycol 3350 17 Gram(s) Oral daily  senna 2 Tablet(s) Oral at bedtime  sodium chloride 0.9% lock flush 3 milliLiter(s) IV Push every 8 hours    MEDICATIONS  (PRN):    RADIOLOGY & ADDITIONAL TESTS:  < from: Xray Chest 1 View- PORTABLE-Routine (Xray Chest 1 View- PORTABLE-Routine in AM.) (22 @ 05:20) >    INTERPRETATION:  Clinical history: Chest pain    Frontal examination of the chest demonstrates the heart to be within   normal limits intransverse diameter. Mild elevation right hemidiaphragm.   Left basilar infiltrates. Small left effusion. Degenerative changes   thoracic spine.    IMPRESSION: Left basilar infiltrates. Small left effusion    < end of copied text >

## 2022-03-13 NOTE — PROGRESS NOTE ADULT - SUBJECTIVE AND OBJECTIVE BOX
Interventional Cardiology PA Precath Note    HPI:  66 year old male, former smoker, with PMHx of HTN, HLD, Hep C, BPH, COVID 2021, Class III Angina with known severe 2 vessel CAD s/p PCI 10/2021 known to Dr. Hauser as an outpatient but cancelled surgery multiple times since October 2021 presented to [facility] ED today complaining of chest pain. Patient states the chest pain is located on the left side and pressure in nature, 7/10 without radiation. He states the pain started this morning and has not improved. States he does get GARCIA and chest pain on exertion for the past couple of months that typically resolves with rest. He states he is able to ambulate 1/2 block prior to getting symptoms which is less than 1 block 6 months prior. He denies any nasuea/vomiting, dizziness, syncope, fever/chills, hematuria, hematochezia, LE edema. Due to known CAD, CT surgery called and patient admitted for surgical evaluation.    Radiology:  X-ray:  CT Scan:  MRI:  Ultrasound/ECHO  Stress test:  Prior Cath Hx:    PMH:    PSH:    SocHX:  FHx:     Allergies:  No Known Allergies    MEDS:   acetaminophen     Tablet .. 650 milliGRAM(s) Oral every 6 hours  aspirin enteric coated 81 milliGRAM(s) Oral daily  atorvastatin 20 milliGRAM(s) Oral at bedtime  clopidogrel Tablet 75 milliGRAM(s) Oral daily  fenofibrate Tablet 145 milliGRAM(s) Oral daily  folic acid 1 milliGRAM(s) Oral daily  glucagon  Injectable 1 milliGRAM(s) IntraMuscular once  heparin   Injectable 5000 Unit(s) SubCutaneous every 12 hours  lidocaine   4% Patch 1 Patch Transdermal daily  metoprolol tartrate 12.5 milliGRAM(s) Oral every 12 hours  pantoprazole    Tablet 40 milliGRAM(s) Oral before breakfast  polyethylene glycol 3350 17 Gram(s) Oral daily  senna 2 Tablet(s) Oral at bedtime  sodium chloride 0.9% lock flush 3 milliLiter(s) IV Push every 8 hours    T(C): 37.6 (03-13-22 @ 14:00), Max: 38.6 (03-13-22 @ 05:47)  HR: 76 (03-13-22 @ 13:27) (64 - 76)  BP: 132/69 (03-13-22 @ 13:27) (110/63 - 140/82)  RR: 20 (03-13-22 @ 13:27) (15 - 20)  SpO2: 96% (03-13-22 @ 13:27) (94% - 96%)  Wt(kg): --    HEENT: NCAT, EOMI, PERRLA  NECK: No JVD, No carotid bruits B/L, +2 Carotid pulses B/L  PULM:  CTA B/L No W/R/R  CARD: RRR, +S1, +S2, No M/R/G  ABD: ND, +BS, NT, no masses  EXT: Warm, no pedal edema, pitting/non-pitting  NEURO: A & O x 3, no focal neurologic deficits  PULSES:   Carotid 2+  Radial 2+  Femoral 2+  DP 2+  PT 1+               13.4   12.99 )-----------( 232      ( 13 Mar 2022 05:44 )             40.2     03-13    139  |  105  |  15  ----------------------------<  100<H>  4.0   |  21<L>  |  1.07    Ca    9.0      13 Mar 2022 05:44  Mg     2.2     03-12    TPro  6.9  /  Alb  3.8  /  TBili  0.9  /  DBili  x   /  AST  48<H>  /  ALT  38  /  AlkPhos  42  03-13    EKG: NSR @67bpm w/ notched R waves in lead I, notched S in lead III, no ischemic changes.  ASA 3				Mallampati class: 2	            Anginal Class: N/A    -H/H = 13.4/31.3. Pt denies BRBPR, hematuria, hematochezia, melena. Pt continued on ASA 81mg QD and Plavix 75mg QD.  -Cr/BUN 15/1.07. EF 55%. Euvolemic on exam. IV NS @ 75cc/hr x 12hrs started pre procedure    Sedation Plan:   Moderate  Patient Is Suitable Candidate For Sedation?     Yes    Pt consented w/ assistance of [employer] ID #[id number]  Risks & benefits of procedure and sedation and risks and benefits for the alternative therapy have been explained to the patient in layman’s terms including but not limited to: allergic reaction, bleeding, infection, arrhythmia, respiratory compromise, renal and vascular compromise, limb damage, MI, CVA, emergent CABG/Vascular Surgery and death. Informed consent obtained and in chart. Interventional Cardiology PA Precath Note    HPI:  66 year old male, former smoker, with PMHx of HTN, HLD, Hep C, BPH, COVID 2021, Class III Angina with known severe 2 vessel CAD s/p PCI 10/2021 known to Dr. Hauser as an outpatient but cancelled surgery multiple times since October 2021 presented to [facility] ED today complaining of chest pain. Patient states the chest pain is located on the left side and pressure in nature, 7/10 without radiation. He states the pain started this morning and has not improved. States he does get GARCIA and chest pain on exertion for the past couple of months that typically resolves with rest. He states he is able to ambulate 1/2 block prior to getting symptoms which is less than 1 block 6 months prior. He denies any nasuea/vomiting, dizziness, syncope, fever/chills, hematuria, hematochezia, LE edema. Due to known CAD, CT surgery called and patient admitted for surgical evaluation.    Radiology:  X-ray:  CT Scan:  MRI:  Ultrasound/ECHO  Stress test:  Prior Cath Hx:    PMH:    PSH:    SocHX:  FHx:     Allergies:  No Known Allergies    MEDS:   acetaminophen     Tablet .. 650 milliGRAM(s) Oral every 6 hours  aspirin enteric coated 81 milliGRAM(s) Oral daily  atorvastatin 20 milliGRAM(s) Oral at bedtime  clopidogrel Tablet 75 milliGRAM(s) Oral daily  fenofibrate Tablet 145 milliGRAM(s) Oral daily  folic acid 1 milliGRAM(s) Oral daily  glucagon  Injectable 1 milliGRAM(s) IntraMuscular once  heparin   Injectable 5000 Unit(s) SubCutaneous every 12 hours  lidocaine   4% Patch 1 Patch Transdermal daily  metoprolol tartrate 12.5 milliGRAM(s) Oral every 12 hours  pantoprazole    Tablet 40 milliGRAM(s) Oral before breakfast  polyethylene glycol 3350 17 Gram(s) Oral daily  senna 2 Tablet(s) Oral at bedtime  sodium chloride 0.9% lock flush 3 milliLiter(s) IV Push every 8 hours    T(C): 37.6 (03-13-22 @ 14:00), Max: 38.6 (03-13-22 @ 05:47)  HR: 76 (03-13-22 @ 13:27) (64 - 76)  BP: 132/69 (03-13-22 @ 13:27) (110/63 - 140/82)  RR: 20 (03-13-22 @ 13:27) (15 - 20)  SpO2: 96% (03-13-22 @ 13:27) (94% - 96%)  Wt(kg): --    HEENT: NCAT, EOMI, PERRLA  NECK: No JVD, No carotid bruits B/L, +2 Carotid pulses B/L  PULM:  CTA B/L  CARD: RRR, +S1, +S2, No M/R/G  ABD: ND, +BS, NT, no masses  EXT: Warm, no pedal edema  NEURO: A & O x 3, no focal neurologic deficits  PULSES:   Carotid 2+  Radial 2+  Femoral 2+  DP 2+  PT 1+               13.4   12.99 )-----------( 232      ( 13 Mar 2022 05:44 )             40.2     03-13    139  |  105  |  15  ----------------------------<  100<H>  4.0   |  21<L>  |  1.07    Ca    9.0      13 Mar 2022 05:44  Mg     2.2     03-12    TPro  6.9  /  Alb  3.8  /  TBili  0.9  /  DBili  x   /  AST  48<H>  /  ALT  38  /  AlkPhos  42  03-13    EKG: NSR @67bpm w/ notched R waves in lead I, notched S in lead III, no ischemic changes.  ASA 3				Mallampati class: 2	            Anginal Class: N/A    -H/H = 13.4/31.3. Pt denies BRBPR, hematuria, hematochezia, melena. Pt continued on ASA 81mg QD and Plavix 75mg QD.  -Cr/BUN 15/1.07. EF 55%. Euvolemic on exam. IV NS @ 75cc/hr x 12hrs started pre procedure per protocol (anne-marie'd by CTICU Dr. Corley)    Sedation Plan:   Moderate  Patient Is Suitable Candidate For Sedation?     Yes    Pt consented w/ assistance of [employer] ID #[id number]  Risks & benefits of procedure and sedation and risks and benefits for the alternative therapy have been explained to the patient in layman’s terms including but not limited to: allergic reaction, bleeding, infection, arrhythmia, respiratory compromise, renal and vascular compromise, limb damage, MI, CVA, emergent CABG/Vascular Surgery and death. Informed consent obtained and in chart. Interventional Cardiology PA Precath Note    HPI:  66 year old male, former smoker, with PMHx of HTN, HLD, Hep C, BPH, COVID 2021, Class III Angina with known severe 2 vessel CAD s/p PCI 10/2021 known to Dr. Hauser as an outpatient but cancelled surgery multiple times since October 2021 presented to [facility] ED today complaining of chest pain. Patient states the chest pain is located on the left side and pressure in nature, 7/10 without radiation. He states the pain started this morning and has not improved. States he does get GARCIA and chest pain on exertion for the past couple of months that typically resolves with rest. He states he is able to ambulate 1/2 block prior to getting symptoms which is less than 1 block 6 months prior. Pt underwent uncomplicated MIDCAB (LIMA-LAD) on 3/10 and was extubated and went to CTICU off pressors that day. Chest tube removed 3/11, mediastinal tube removed 3/12. Post-op course c/b fever, with last 3/12-3/13 O/N (TMax 101.4) which resolved after tylenol. Fever/infection w/u negative, WBC downtrending, CXR unchanged, UA neg. Pt now pending staged PCI of RI w/ Dr. Thomas on 3/14.    Radiology:  X-ray:  CT Scan:  MRI:  Ultrasound/ECHO  Stress test:  Prior Cath Hx:    PMH:    PSH:    SocHX:  FHx:     Allergies:  No Known Allergies    MEDS:   acetaminophen     Tablet .. 650 milliGRAM(s) Oral every 6 hours  aspirin enteric coated 81 milliGRAM(s) Oral daily  atorvastatin 20 milliGRAM(s) Oral at bedtime  clopidogrel Tablet 75 milliGRAM(s) Oral daily  fenofibrate Tablet 145 milliGRAM(s) Oral daily  folic acid 1 milliGRAM(s) Oral daily  glucagon  Injectable 1 milliGRAM(s) IntraMuscular once  heparin   Injectable 5000 Unit(s) SubCutaneous every 12 hours  lidocaine   4% Patch 1 Patch Transdermal daily  metoprolol tartrate 12.5 milliGRAM(s) Oral every 12 hours  pantoprazole    Tablet 40 milliGRAM(s) Oral before breakfast  polyethylene glycol 3350 17 Gram(s) Oral daily  senna 2 Tablet(s) Oral at bedtime  sodium chloride 0.9% lock flush 3 milliLiter(s) IV Push every 8 hours    T(C): 37.6 (03-13-22 @ 14:00), Max: 38.6 (03-13-22 @ 05:47)  HR: 76 (03-13-22 @ 13:27) (64 - 76)  BP: 132/69 (03-13-22 @ 13:27) (110/63 - 140/82)  RR: 20 (03-13-22 @ 13:27) (15 - 20)  SpO2: 96% (03-13-22 @ 13:27) (94% - 96%)  Wt(kg): --    HEENT: NCAT, EOMI, PERRLA  NECK: No JVD, No carotid bruits B/L, +2 Carotid pulses B/L  PULM:  CTA B/L  CARD: RRR, +S1, +S2, No M/R/G  ABD: ND, +BS, NT, no masses  EXT: Warm, no pedal edema  NEURO: A & O x 3, no focal neurologic deficits  PULSES:   Carotid 2+  Radial 2+  Femoral 2+  DP 2+  PT 1+               13.4   12.99 )-----------( 232      ( 13 Mar 2022 05:44 )             40.2     03-13    139  |  105  |  15  ----------------------------<  100<H>  4.0   |  21<L>  |  1.07    Ca    9.0      13 Mar 2022 05:44  Mg     2.2     03-12    TPro  6.9  /  Alb  3.8  /  TBili  0.9  /  DBili  x   /  AST  48<H>  /  ALT  38  /  AlkPhos  42  03-13    EKG: NSR @67bpm w/ notched R waves in lead I, notched S in lead III, no ischemic changes.  ASA 3				Mallampati class: 2	            Anginal Class: N/A    -H/H = 13.4/31.3. Pt denies BRBPR, hematuria, hematochezia, melena. Pt continued on ASA 81mg QD and Plavix 75mg QD.  -Cr/BUN 15/1.07. EF 55%. Euvolemic on exam. IV NS @ 75cc/hr x 12hrs started pre procedure per protocol (vi by CTICU Dr. Corley)    Sedation Plan:   Moderate  Patient Is Suitable Candidate For Sedation?     Yes    Pt consented w/ assistance of [employer] ID #[id number]  Risks & benefits of procedure and sedation and risks and benefits for the alternative therapy have been explained to the patient in layman’s terms including but not limited to: allergic reaction, bleeding, infection, arrhythmia, respiratory compromise, renal and vascular compromise, limb damage, MI, CVA, emergent CABG/Vascular Surgery and death. Informed consent obtained and in chart.

## 2022-03-13 NOTE — PROGRESS NOTE ADULT - ASSESSMENT
66 year old male, former smoker, with PMHx of HTN, HLD, Hep C, BPH, COVID 2021, Class III Angina with known severe 2 vessel CAD s/p PCI 10/202, known to Dr. Hauser as an outpatient but cancelled surgery multiple times since October 202, who presented to [facility] ED 3/9/22 complaining of chest pain and GARCIA. He was admitted under Dr. Hauser for surgical evaluation and was deemed a good candidate for intervention. On 3/10 he underwent an uncomplicated MIDCAB (LIMA-LAD). Post operatively he was brought to CTICU extubated, in stable condition. He got out of bed to chair and became orthostatic for which he was given volume. Symptoms improved. POD1 he was de-lined and transferred to [facility] as floor care. Pleural chest tube was removed after successful clamp trial. POD2 patient febrile, asymptomatic. POD2 clamp trial and eventual removal of mediastinal porfirio. POD3 patient febrile overnight, UA and CXr negative. Patient swabbed for covid, negative. Planned for cardiac cath/ PCI tomorrow. NPO at midnight,     Neurovascular:   -No delirium. Pain well controlled with current regimen.  -Continue tylenol, oxy IR PRN    Cardiovascular:   -POD3 s/p MIDCAB (LIMA-LAD), EF 55% per fellow TTE pre op  -Planned for PCI Ramus 3/14 with Dr. Rivero  -Hemodynamically stable. HR controlled (65-90)  -Hx of HTN, BP controlled (115//57), continue metoprolol 12.5 mg q12 hours  -CAD s/p MIDCAB: continue ASA, atorvastatin 20, plavix    Respiratory:   -02 Sat = 98% on RA  -Pleural CT removed POD1 after clamp trial   -Porfirio removed POD2 s/p clamp triaa  -Encourage C+DB and Use of IS 10x / hr while awake.  -CXR without effusions, PTX - possible consolidation    GI:   -Stable.  -Continue GI PPX with protonix  -PO Diet.    Renal / :  -BUN/Cr stable at 15/1.07  -Continue to monitor renal function.  -Monitor I/O's.  - Replete electrolytes PRN    Endocrine:    -A1c 5.6, no known hx DM, continue MISS  -TSH 0.945, no known hx thyroid disease     Hematologic:  -H/H stable at 13.4/40.2 with no obvious signs of bleeding  -Coagulation Panel.  -Continue folic acid 1 mg daily     ID:  -WBC 12.99 (slightly downtrending from 13.77)  -UA negative  -Febrile to 101.4 overnight; no fevers today  -Continue to monitor CBC  -Observe for SIRS/Sepsis Syndrome  -Obtain new COVID swab for PCI Monday    Prophylaxis:  -DVT prophylaxis with 5000 SubQ Heparin q12 hr- lowered dose for elevated PTT  -SCD's    Disposition:  -PCI Monday with Dr. Rivero, then home

## 2022-03-14 LAB
ALBUMIN SERPL ELPH-MCNC: 3.8 G/DL — SIGNIFICANT CHANGE UP (ref 3.3–5)
ALP SERPL-CCNC: 65 U/L — SIGNIFICANT CHANGE UP (ref 40–120)
ALT FLD-CCNC: 40 U/L — SIGNIFICANT CHANGE UP (ref 10–45)
ANION GAP SERPL CALC-SCNC: 12 MMOL/L — SIGNIFICANT CHANGE UP (ref 5–17)
APTT BLD: 36.4 SEC — HIGH (ref 27.5–35.5)
AST SERPL-CCNC: 46 U/L — HIGH (ref 10–40)
BILIRUB SERPL-MCNC: 0.5 MG/DL — SIGNIFICANT CHANGE UP (ref 0.2–1.2)
BUN SERPL-MCNC: 19 MG/DL — SIGNIFICANT CHANGE UP (ref 7–23)
CALCIUM SERPL-MCNC: 8.8 MG/DL — SIGNIFICANT CHANGE UP (ref 8.4–10.5)
CHLORIDE SERPL-SCNC: 105 MMOL/L — SIGNIFICANT CHANGE UP (ref 96–108)
CO2 SERPL-SCNC: 21 MMOL/L — LOW (ref 22–31)
CREAT SERPL-MCNC: 1.23 MG/DL — SIGNIFICANT CHANGE UP (ref 0.5–1.3)
EGFR: 65 ML/MIN/1.73M2 — SIGNIFICANT CHANGE UP
GLUCOSE SERPL-MCNC: 112 MG/DL — HIGH (ref 70–99)
HCT VFR BLD CALC: 39.4 % — SIGNIFICANT CHANGE UP (ref 39–50)
HCV RNA FLD QL NAA+PROBE: SIGNIFICANT CHANGE UP
HGB BLD-MCNC: 13.3 G/DL — SIGNIFICANT CHANGE UP (ref 13–17)
INR BLD: 1.02 — SIGNIFICANT CHANGE UP (ref 0.88–1.16)
MAGNESIUM SERPL-MCNC: 2.1 MG/DL — SIGNIFICANT CHANGE UP (ref 1.6–2.6)
MCHC RBC-ENTMCNC: 31.1 PG — SIGNIFICANT CHANGE UP (ref 27–34)
MCHC RBC-ENTMCNC: 33.8 GM/DL — SIGNIFICANT CHANGE UP (ref 32–36)
MCV RBC AUTO: 92.3 FL — SIGNIFICANT CHANGE UP (ref 80–100)
NRBC # BLD: 0 /100 WBCS — SIGNIFICANT CHANGE UP (ref 0–0)
PLATELET # BLD AUTO: 279 K/UL — SIGNIFICANT CHANGE UP (ref 150–400)
POTASSIUM SERPL-MCNC: 3.9 MMOL/L — SIGNIFICANT CHANGE UP (ref 3.5–5.3)
POTASSIUM SERPL-SCNC: 3.9 MMOL/L — SIGNIFICANT CHANGE UP (ref 3.5–5.3)
PROT SERPL-MCNC: 6.9 G/DL — SIGNIFICANT CHANGE UP (ref 6–8.3)
PROTHROM AB SERPL-ACNC: 12.1 SEC — SIGNIFICANT CHANGE UP (ref 10.5–13.4)
RBC # BLD: 4.27 M/UL — SIGNIFICANT CHANGE UP (ref 4.2–5.8)
RBC # FLD: 13 % — SIGNIFICANT CHANGE UP (ref 10.3–14.5)
SODIUM SERPL-SCNC: 138 MMOL/L — SIGNIFICANT CHANGE UP (ref 135–145)
WBC # BLD: 10.54 K/UL — HIGH (ref 3.8–10.5)
WBC # FLD AUTO: 10.54 K/UL — HIGH (ref 3.8–10.5)

## 2022-03-14 PROCEDURE — 71045 X-RAY EXAM CHEST 1 VIEW: CPT | Mod: 26

## 2022-03-14 RX ORDER — POTASSIUM CHLORIDE 20 MEQ
10 PACKET (EA) ORAL ONCE
Refills: 0 | Status: COMPLETED | OUTPATIENT
Start: 2022-03-14 | End: 2022-03-14

## 2022-03-14 RX ORDER — CLOPIDOGREL BISULFATE 75 MG/1
225 TABLET, FILM COATED ORAL ONCE
Refills: 0 | Status: COMPLETED | OUTPATIENT
Start: 2022-03-14 | End: 2022-03-14

## 2022-03-14 RX ORDER — ACETAMINOPHEN 500 MG
1000 TABLET ORAL ONCE
Refills: 0 | Status: COMPLETED | OUTPATIENT
Start: 2022-03-14 | End: 2022-03-14

## 2022-03-14 RX ADMIN — Medication 650 MILLIGRAM(S): at 01:57

## 2022-03-14 RX ADMIN — PANTOPRAZOLE SODIUM 40 MILLIGRAM(S): 20 TABLET, DELAYED RELEASE ORAL at 07:13

## 2022-03-14 RX ADMIN — Medication 400 MILLIGRAM(S): at 12:01

## 2022-03-14 RX ADMIN — HEPARIN SODIUM 5000 UNIT(S): 5000 INJECTION INTRAVENOUS; SUBCUTANEOUS at 05:55

## 2022-03-14 RX ADMIN — LIDOCAINE 1 PATCH: 4 CREAM TOPICAL at 19:52

## 2022-03-14 RX ADMIN — CLOPIDOGREL BISULFATE 225 MILLIGRAM(S): 75 TABLET, FILM COATED ORAL at 14:41

## 2022-03-14 RX ADMIN — Medication 12.5 MILLIGRAM(S): at 21:49

## 2022-03-14 RX ADMIN — SODIUM CHLORIDE 3 MILLILITER(S): 9 INJECTION INTRAMUSCULAR; INTRAVENOUS; SUBCUTANEOUS at 22:40

## 2022-03-14 RX ADMIN — Medication 650 MILLIGRAM(S): at 00:10

## 2022-03-14 RX ADMIN — Medication 650 MILLIGRAM(S): at 22:40

## 2022-03-14 RX ADMIN — CLOPIDOGREL BISULFATE 75 MILLIGRAM(S): 75 TABLET, FILM COATED ORAL at 11:45

## 2022-03-14 RX ADMIN — SODIUM CHLORIDE 3 MILLILITER(S): 9 INJECTION INTRAMUSCULAR; INTRAVENOUS; SUBCUTANEOUS at 06:12

## 2022-03-14 RX ADMIN — LIDOCAINE 1 PATCH: 4 CREAM TOPICAL at 12:15

## 2022-03-14 RX ADMIN — Medication 650 MILLIGRAM(S): at 07:17

## 2022-03-14 RX ADMIN — Medication 1000 MILLIGRAM(S): at 12:20

## 2022-03-14 RX ADMIN — Medication 1 MILLIGRAM(S): at 11:45

## 2022-03-14 RX ADMIN — SENNA PLUS 2 TABLET(S): 8.6 TABLET ORAL at 21:49

## 2022-03-14 RX ADMIN — Medication 145 MILLIGRAM(S): at 11:45

## 2022-03-14 RX ADMIN — Medication 10 MILLIEQUIVALENT(S): at 07:48

## 2022-03-14 RX ADMIN — HEPARIN SODIUM 5000 UNIT(S): 5000 INJECTION INTRAVENOUS; SUBCUTANEOUS at 21:49

## 2022-03-14 RX ADMIN — ATORVASTATIN CALCIUM 20 MILLIGRAM(S): 80 TABLET, FILM COATED ORAL at 21:49

## 2022-03-14 RX ADMIN — Medication 12.5 MILLIGRAM(S): at 05:55

## 2022-03-14 RX ADMIN — Medication 81 MILLIGRAM(S): at 11:45

## 2022-03-14 RX ADMIN — Medication 650 MILLIGRAM(S): at 07:13

## 2022-03-14 RX ADMIN — SODIUM CHLORIDE 3 MILLILITER(S): 9 INJECTION INTRAMUSCULAR; INTRAVENOUS; SUBCUTANEOUS at 13:12

## 2022-03-14 RX ADMIN — Medication 650 MILLIGRAM(S): at 21:50

## 2022-03-14 NOTE — DIETITIAN INITIAL EVALUATION ADULT. - ADD RECOMMEND
1. Continue with current diet order (monitor need for ONS) 2. Encourage pt to meet nutritional needs as able 3. Encourage adherence to diet education (reinforce as able) 4. Pain and bowel regimen per team 5. Will continue to assess/honor preferences as able

## 2022-03-14 NOTE — DIETITIAN INITIAL EVALUATION ADULT. - PERTINENT LABORATORY DATA
Sodium, Serum: 138 mmol/L  Potassium, Serum: 3.9 mmol/L  Chloride, Serum: 105 mmol/L  BUN, Serum: 19 mg/dL  Creatinine, Serum: 1.23 mg/dL  Glucose, Serum: 112 mg/dL (Elevated)  Calcium, Serum: 8.8 mg/dL  Magnesium, Serum: 2.1 mg/dL    Last HbA1c 5.6% (3/9)  Fingersticks 3/13: 104, 150 mg/dL    Lipid Profile (3/9)  Cholesterol, Serum: 163 mg/dL  Triglycerides, Serum: 205 mg/dL (Elevated)  HDL Cholesterol, Serum: 50 mg/dL   LDL Cholesterol Calculated: 72 mg/dL

## 2022-03-14 NOTE — DIETITIAN INITIAL EVALUATION ADULT. - OTHER INFO
66 year old male, former smoker, with PMHx of HTN, HLD, Hep C, BPH, COVID 2021, Class III Angina with known severe 2 vessel CAD s/p PCI 10/202, known to Dr. Hauser as an outpatient but cancelled surgery multiple times since October 202, who presented to [facility] ED 3/9/22 complaining of chest pain and GARCIA. He was admitted under Dr. Hauser for surgical evaluation and was deemed a good candidate for intervention. On 3/10 he underwent an uncomplicated MIDCAB (LIMA-LAD). Post operatively he was brought to CTICU extubated, in stable condition. He got out of bed to chair and became orthostatic for which he was given volume. Symptoms improved. POD1 he was de-lined and transferred to [facility] as floor care. Pleural chest tube was removed after successful clamp trial. POD2 patient febrile, asymptomatic. POD2 clamp trial and eventual removal of mediastinal porfirio. POD3 patient febrile overnight, UA negative. POD 4 underwent PCI of ramus with Dr. Rivero.      Pt seen at bedside for initial assessment- pt called daughter from cell phone as he does not speak English well thus interview deferred to daughter. Last documented bowel movement 3/12. Daughter confirms NKFA. Denies change in appetite PTA; endorses 3 meals/day at home (reports following typical [place] diet at home). Reports usual body weight 146 pounds (relatively consistent with dosing weight 145 pounds). Per EMR, pt 145 pounds (10/28/21); stable. No edema documented at this time. No pressure ulcers documented at this time. L thoracotomy, chest tube incision per chart. Lev score=20. Labs reviewed 3/14; electrolytes within normal limits at this time. Observed pt with no overt signs of muscle or fat wasting. Based on ASPEN guidelines, pt does not meet criteria for malnutrition at this time. Provided daughter with diet education regarding importance of meeting nutritional needs post operatively ; amenable to education. Discussed current diet order DASH/TLC; provided handouts at bedside in English per daughter request. Daughter reports pt feeling hungry during hospital stay, able to complete ~50% of meals. No noted preferences at this time. Made aware RD remains available. RD to follow up per protocol. See nutrition recommendations below.

## 2022-03-14 NOTE — PROGRESS NOTE ADULT - ASSESSMENT
66 year old male, former smoker, with PMHx of HTN, HLD, Hep C, BPH, COVID 2021, Class III Angina with known severe 2 vessel CAD s/p PCI 10/202, known to Dr. Hauser as an outpatient but cancelled surgery multiple times since October 202, who presented to [facility] ED 3/9/22 complaining of chest pain and GARCIA. He was admitted under Dr. Hauser for surgical evaluation and was deemed a good candidate for intervention. On 3/10 he underwent an uncomplicated MIDCAB (LIMA-LAD). Post operatively he was brought to CTICU extubated, in stable condition. He got out of bed to chair and became orthostatic for which he was given volume. Symptoms improved. POD1 he was de-lined and transferred to [facility] as floor care. Pleural chest tube was removed after successful clamp trial. POD2 patient febrile, asymptomatic. POD2 clamp trial and eventual removal of mediastinal pofririo. POD3 patient febrile overnight, UA negative. POD 4 underwent PCI of ramus with Dr. Rivero    Neurovascular:   - No delirium. Pain well controlled with current regimen.  - Continue tylenol prn    Cardiovascular:   - POD4 s/p MIDCAB (LIMA-LAD), EF 55% per fellow TTE pre op  - Planned for PCI Ramus 3/14 with Dr. Rivero  - Hemodynamically stable. HR controlled  - Hx of HTN, BP controlled  - Continue metoprolol 12.5 q12h  - CAD s/p MIDCAB: continue ASA 81, atorvastatin 20, plavix 75    Respiratory:   - 02 Sat = 98% on RA  - Chest US done today, no large effusions. No indication for pigtail placement  - Pleural CT removed POD1 after clamp trial   - Porfirio removed POD2 s/p clamp triaa  - Encourage C+DB and Use of IS 10x / hr while awake.  - CXR without effusions, PTX - possible consolidation    GI:   - Stable.  - Continue GI PPX with protonix  - Tolerating PO Diet  - NPO today after light breakfast for PCI    Renal / :  - BUN/Cr stable at 19/1.23  - Continue to monitor renal function.  - Monitor I/O's.  - Replete electrolytes PRN    Endocrine:    - A1c 5.6, no known hx DM, continue MISS  - TSH 0.945, no known hx thyroid disease     Hematologic:  - H/H stable at 13.4/40.2 with no obvious signs of bleeding  - PTT 36.4, INR 1.02  - Continue folic acid 1 mg daily     ID:  -WBC 10.2 (slightly downtrending from 12.99)  - UA negative  - Febrile to 102 at 1:00am 3/14  - Continue to monitor CBC  - Observe for SIRS/Sepsis Syndrome    Prophylaxis:  - DVT prophylaxis with 5000 SubQ Heparin q12 hr- lowered dose for elevated PTT  - SCD's    Disposition:  - PCI this afternoon  - DC home tomorrow

## 2022-03-14 NOTE — CHART NOTE - NSCHARTNOTESELECT_GEN_ALL_CORE
What Type Of Note Output Would You Prefer (Optional)?: Standard Output Event Note Hpi Title: Evaluation of Skin Lesions How Severe Are Your Spot(S)?: mild Have Your Spot(S) Been Treated In The Past?: has not been treated

## 2022-03-14 NOTE — PROGRESS NOTE ADULT - SUBJECTIVE AND OBJECTIVE BOX
Patient discussed on morning rounds with       Operation / Date:     SUBJECTIVE ASSESSMENT:  66y Male         Vital Signs Last 24 Hrs  T(C): 37.1 (14 Mar 2022 09:11), Max: 38.9 (14 Mar 2022 01:14)  T(F): 98.7 (14 Mar 2022 09:11), Max: 102.1 (14 Mar 2022 01:14)  HR: 72 (14 Mar 2022 12:05) (69 - 86)  BP: 136/65 (14 Mar 2022 12:05) (110/67 - 155/82)  BP(mean): 93 (14 Mar 2022 12:05) (83 - 112)  RR: 16 (14 Mar 2022 12:05) (14 - 20)  SpO2: 96% (14 Mar 2022 12:05) (93% - 96%)  I&O's Detail    13 Mar 2022 07:01  -  14 Mar 2022 07:00  --------------------------------------------------------  IN:    Oral Fluid: 260 mL    sodium chloride 0.9%: 900 mL  Total IN: 1160 mL    OUT:    Voided (mL): 2000 mL  Total OUT: 2000 mL    Total NET: -840 mL          CHEST TUBE:  Yes/No. AIR LEAKS: Yes/No. Suction / H2O SEAL.   SHELDON DRAIN:  Yes/No.  EPICARDIAL WIRES: Yes/No.  TIE DOWNS: Yes/No.  WEINSTEIN: Yes/No.    PHYSICAL EXAM:    General:     Neurological:    Cardiovascular:    Respiratory:    Gastrointestinal:    Extremities:    Vascular:    Incision Sites:    LABS:                        13.3   10.54 )-----------( 279      ( 14 Mar 2022 05:57 )             39.4       COUMADIN:  Yes/No. REASON: .    PT/INR - ( 14 Mar 2022 05:57 )   PT: 12.1 sec;   INR: 1.02          PTT - ( 14 Mar 2022 05:57 )  PTT:36.4 sec        138  |  105  |  19  ----------------------------<  112<H>  3.9   |  21<L>  |  1.23    Ca    8.8      14 Mar 2022 05:57  Mg     2.1     -14    TPro  6.9  /  Alb  3.8  /  TBili  0.5  /  DBili  x   /  AST  46<H>  /  ALT  40  /  AlkPhos  65  03-14      Urinalysis Basic - ( 12 Mar 2022 16:16 )    Color: Yellow / Appearance: Clear / S.015 / pH: x  Gluc: x / Ketone: NEGATIVE  / Bili: Negative / Urobili: 0.2 E.U./dL   Blood: x / Protein: NEGATIVE mg/dL / Nitrite: NEGATIVE   Leuk Esterase: NEGATIVE / RBC: < 5 /HPF / WBC < 5 /HPF   Sq Epi: x / Non Sq Epi: 0-5 /HPF / Bacteria: Present /HPF        MEDICATIONS  (STANDING):  acetaminophen     Tablet .. 650 milliGRAM(s) Oral every 6 hours  aspirin enteric coated 81 milliGRAM(s) Oral daily  atorvastatin 20 milliGRAM(s) Oral at bedtime  chlorhexidine 4% Liquid 1 Application(s) Topical once  clopidogrel Tablet 225 milliGRAM(s) Oral once  clopidogrel Tablet 75 milliGRAM(s) Oral daily  fenofibrate Tablet 145 milliGRAM(s) Oral daily  folic acid 1 milliGRAM(s) Oral daily  glucagon  Injectable 1 milliGRAM(s) IntraMuscular once  heparin   Injectable 5000 Unit(s) SubCutaneous every 12 hours  lidocaine   4% Patch 1 Patch Transdermal daily  metoprolol tartrate 12.5 milliGRAM(s) Oral every 12 hours  pantoprazole    Tablet 40 milliGRAM(s) Oral before breakfast  polyethylene glycol 3350 17 Gram(s) Oral daily  senna 2 Tablet(s) Oral at bedtime  sodium chloride 0.9% lock flush 3 milliLiter(s) IV Push every 8 hours  sodium chloride 0.9%. 1000 milliLiter(s) (75 mL/Hr) IV Continuous <Continuous>    MEDICATIONS  (PRN):        RADIOLOGY & ADDITIONAL TESTS:     Patient discussed on morning rounds with Dr. Hauser    Operation / Date: 3/10 [facility] ([facility])    SUBJECTIVE ASSESSMENT:  66y Male seen and examined at bedside. No acute events overnight. Denies CP/SOB/N/V/D/dizziness/cough/fever/chills.      Vital Signs Last 24 Hrs  T(C): 37.1 (14 Mar 2022 09:11), Max: 38.9 (14 Mar 2022 01:14)  T(F): 98.7 (14 Mar 2022 09:11), Max: 102.1 (14 Mar 2022 01:14)  HR: 72 (14 Mar 2022 12:05) (69 - 86)  BP: 136/65 (14 Mar 2022 12:05) (110/67 - 155/82)  BP(mean): 93 (14 Mar 2022 12:05) (83 - 112)  RR: 16 (14 Mar 2022 12:05) (14 - 20)  SpO2: 96% (14 Mar 2022 12:05) (93% - 96%)  I&O's Detail    13 Mar 2022 07:01  -  14 Mar 2022 07:00  --------------------------------------------------------  IN:    Oral Fluid: 260 mL    sodium chloride 0.9%: 900 mL  Total IN: 1160 mL    OUT:    Voided (mL): 2000 mL  Total OUT: 2000 mL    Total NET: -840 mL        CHEST TUBE:  NO  SHELDON DRAIN:  NO  EPICARDIAL WIRES: NO  TIE DOWNS: YES  WEINSTEIN: NO      PHYSICAL EXAM:  GEN: NAD, looks comfortable  Psych: Mood appropriate  Neuro: A&Ox3.  No focal deficits.  Moving all extremities.   HEENT: No obvious abnormalities  CV: S1S2, regular, no murmurs appreciated.  No carotid bruits.  No JVD  Lungs: Clear B/L.  No wheezing, rales or rhonchi  ABD: Soft, non-tender, non-distended.  +Bowel sounds  EXT: Warm and well perfused.  No peripheral edema noted  Musculoskeletal: Moving all extremities with normal ROM, no joint swelling  PV: Pedal pulses palpable  Incision Sites: C/D/I        LABS:                        13.3   10.54 )-----------( 279      ( 14 Mar 2022 05:57 )             39.4       COUMADIN:  NO    PT/INR - ( 14 Mar 2022 05:57 )   PT: 12.1 sec;   INR: 1.02          PTT - ( 14 Mar 2022 05:57 )  PTT:36.4 sec        138  |  105  |  19  ----------------------------<  112<H>  3.9   |  21<L>  |  1.23    Ca    8.8      14 Mar 2022 05:57  Mg     2.1         TPro  6.9  /  Alb  3.8  /  TBili  0.5  /  DBili  x   /  AST  46<H>  /  ALT  40  /  AlkPhos  65        Urinalysis Basic - ( 12 Mar 2022 16:16 )    Color: Yellow / Appearance: Clear / S.015 / pH: x  Gluc: x / Ketone: NEGATIVE  / Bili: Negative / Urobili: 0.2 E.U./dL   Blood: x / Protein: NEGATIVE mg/dL / Nitrite: NEGATIVE   Leuk Esterase: NEGATIVE / RBC: < 5 /HPF / WBC < 5 /HPF   Sq Epi: x / Non Sq Epi: 0-5 /HPF / Bacteria: Present /HPF        MEDICATIONS  (STANDING):  acetaminophen     Tablet .. 650 milliGRAM(s) Oral every 6 hours  aspirin enteric coated 81 milliGRAM(s) Oral daily  atorvastatin 20 milliGRAM(s) Oral at bedtime  chlorhexidine 4% Liquid 1 Application(s) Topical once  clopidogrel Tablet 225 milliGRAM(s) Oral once  clopidogrel Tablet 75 milliGRAM(s) Oral daily  fenofibrate Tablet 145 milliGRAM(s) Oral daily  folic acid 1 milliGRAM(s) Oral daily  glucagon  Injectable 1 milliGRAM(s) IntraMuscular once  heparin   Injectable 5000 Unit(s) SubCutaneous every 12 hours  lidocaine   4% Patch 1 Patch Transdermal daily  metoprolol tartrate 12.5 milliGRAM(s) Oral every 12 hours  pantoprazole    Tablet 40 milliGRAM(s) Oral before breakfast  polyethylene glycol 3350 17 Gram(s) Oral daily  senna 2 Tablet(s) Oral at bedtime  sodium chloride 0.9% lock flush 3 milliLiter(s) IV Push every 8 hours  sodium chloride 0.9%. 1000 milliLiter(s) (75 mL/Hr) IV Continuous <Continuous>    MEDICATIONS  (PRN):        RADIOLOGY & ADDITIONAL TESTS:  CXR 3/14/22: Final read pending. LLL opacity vs atelectasis  Chest US done, small amount of pleural fluid with poor windows. No indication for pigtail placement at this time

## 2022-03-14 NOTE — CHART NOTE - NSCHARTNOTEFT_GEN_A_CORE
Interventional Cardiology PA Sheath Pull Note    Pt without complaints. VSS    Pre-Sheath Removal:   Right femoral artery 6Fr sheath in place, no hematoma/bleeding noted.   Pulses R DP/PT 2+    Hemostasis achieved w/ 18min of manual pressure.   No Vasovagal reaction.   No meds given.     Post-Sheath Removal:   Right groin w/o hematoma/bleeding; No bruit noted on exam.   Pulses: 2+ R DP/PT to baseline.     A/P:   S/p cardiac cath 3/14/22: PTCA/BRANDON RI (75%), Findings: LM diffuse 30% stenosis, pLAD 75%, mLAD 75%, 70% apical LAD, LCx luminal, RCA lunimal, patent LIMA- LAD    Continue bedrest x4hrs.     Please contact interventional cardiology team with any questions/concerns - [phone].

## 2022-03-14 NOTE — CHART NOTE - NSCHARTNOTEFT_GEN_A_CORE
Exam:  US Chest    Procedure Date:    History: 41y Male whose CXR today shows a possible left sided pleural effusion.  Pt is POD # 4 from MIDCAB     Findings:                    Evaluation of the LEFT side of the thoracic cavity demonstrates                   small pleural effusion with poor windows            Impression:  Small left sided pleural effusion  Windows poor    Will obtain PA/lat CXR today  Aggressive chest PT and ambulation

## 2022-03-15 ENCOUNTER — TRANSCRIPTION ENCOUNTER (OUTPATIENT)
Age: 67
End: 2022-03-15

## 2022-03-15 VITALS
SYSTOLIC BLOOD PRESSURE: 140 MMHG | DIASTOLIC BLOOD PRESSURE: 61 MMHG | HEART RATE: 71 BPM | RESPIRATION RATE: 16 BRPM | OXYGEN SATURATION: 98 %

## 2022-03-15 LAB
ANION GAP SERPL CALC-SCNC: 13 MMOL/L — SIGNIFICANT CHANGE UP (ref 5–17)
BUN SERPL-MCNC: 18 MG/DL — SIGNIFICANT CHANGE UP (ref 7–23)
CALCIUM SERPL-MCNC: 8.9 MG/DL — SIGNIFICANT CHANGE UP (ref 8.4–10.5)
CHLORIDE SERPL-SCNC: 107 MMOL/L — SIGNIFICANT CHANGE UP (ref 96–108)
CO2 SERPL-SCNC: 19 MMOL/L — LOW (ref 22–31)
CREAT SERPL-MCNC: 1.01 MG/DL — SIGNIFICANT CHANGE UP (ref 0.5–1.3)
EGFR: 82 ML/MIN/1.73M2 — SIGNIFICANT CHANGE UP
GLUCOSE SERPL-MCNC: 95 MG/DL — SIGNIFICANT CHANGE UP (ref 70–99)
HCT VFR BLD CALC: 38.4 % — LOW (ref 39–50)
HGB BLD-MCNC: 12.8 G/DL — LOW (ref 13–17)
MAGNESIUM SERPL-MCNC: 2.2 MG/DL — SIGNIFICANT CHANGE UP (ref 1.6–2.6)
MCHC RBC-ENTMCNC: 31.3 PG — SIGNIFICANT CHANGE UP (ref 27–34)
MCHC RBC-ENTMCNC: 33.3 GM/DL — SIGNIFICANT CHANGE UP (ref 32–36)
MCV RBC AUTO: 93.9 FL — SIGNIFICANT CHANGE UP (ref 80–100)
NRBC # BLD: 0 /100 WBCS — SIGNIFICANT CHANGE UP (ref 0–0)
PLATELET # BLD AUTO: 299 K/UL — SIGNIFICANT CHANGE UP (ref 150–400)
POTASSIUM SERPL-MCNC: 3.9 MMOL/L — SIGNIFICANT CHANGE UP (ref 3.5–5.3)
POTASSIUM SERPL-SCNC: 3.9 MMOL/L — SIGNIFICANT CHANGE UP (ref 3.5–5.3)
RBC # BLD: 4.09 M/UL — LOW (ref 4.2–5.8)
RBC # FLD: 13.2 % — SIGNIFICANT CHANGE UP (ref 10.3–14.5)
SODIUM SERPL-SCNC: 139 MMOL/L — SIGNIFICANT CHANGE UP (ref 135–145)
WBC # BLD: 11.62 K/UL — HIGH (ref 3.8–10.5)
WBC # FLD AUTO: 11.62 K/UL — HIGH (ref 3.8–10.5)

## 2022-03-15 PROCEDURE — 83735 ASSAY OF MAGNESIUM: CPT

## 2022-03-15 PROCEDURE — 82330 ASSAY OF CALCIUM: CPT

## 2022-03-15 PROCEDURE — P9045: CPT

## 2022-03-15 PROCEDURE — 85025 COMPLETE CBC W/AUTO DIFF WBC: CPT

## 2022-03-15 PROCEDURE — 86900 BLOOD TYPING SEROLOGIC ABO: CPT

## 2022-03-15 PROCEDURE — 86901 BLOOD TYPING SEROLOGIC RH(D): CPT

## 2022-03-15 PROCEDURE — 82947 ASSAY GLUCOSE BLOOD QUANT: CPT

## 2022-03-15 PROCEDURE — 93005 ELECTROCARDIOGRAM TRACING: CPT

## 2022-03-15 PROCEDURE — 84132 ASSAY OF SERUM POTASSIUM: CPT

## 2022-03-15 PROCEDURE — C1894: CPT

## 2022-03-15 PROCEDURE — U0003: CPT

## 2022-03-15 PROCEDURE — 85027 COMPLETE CBC AUTOMATED: CPT

## 2022-03-15 PROCEDURE — C1889: CPT

## 2022-03-15 PROCEDURE — 83036 HEMOGLOBIN GLYCOSYLATED A1C: CPT

## 2022-03-15 PROCEDURE — 84480 ASSAY TRIIODOTHYRONINE (T3): CPT

## 2022-03-15 PROCEDURE — 93880 EXTRACRANIAL BILAT STUDY: CPT

## 2022-03-15 PROCEDURE — 87521 HEPATITIS C PROBE&RVRS TRNSC: CPT

## 2022-03-15 PROCEDURE — 84295 ASSAY OF SERUM SODIUM: CPT

## 2022-03-15 PROCEDURE — 83880 ASSAY OF NATRIURETIC PEPTIDE: CPT

## 2022-03-15 PROCEDURE — 36415 COLL VENOUS BLD VENIPUNCTURE: CPT

## 2022-03-15 PROCEDURE — 86923 COMPATIBILITY TEST ELECTRIC: CPT

## 2022-03-15 PROCEDURE — 71045 X-RAY EXAM CHEST 1 VIEW: CPT

## 2022-03-15 PROCEDURE — 80061 LIPID PANEL: CPT

## 2022-03-15 PROCEDURE — 80053 COMPREHEN METABOLIC PANEL: CPT

## 2022-03-15 PROCEDURE — 84484 ASSAY OF TROPONIN QUANT: CPT

## 2022-03-15 PROCEDURE — U0005: CPT

## 2022-03-15 PROCEDURE — 71046 X-RAY EXAM CHEST 2 VIEWS: CPT

## 2022-03-15 PROCEDURE — 85014 HEMATOCRIT: CPT

## 2022-03-15 PROCEDURE — 82553 CREATINE MB FRACTION: CPT

## 2022-03-15 PROCEDURE — 84436 ASSAY OF TOTAL THYROXINE: CPT

## 2022-03-15 PROCEDURE — C9399: CPT

## 2022-03-15 PROCEDURE — 84100 ASSAY OF PHOSPHORUS: CPT

## 2022-03-15 PROCEDURE — 84443 ASSAY THYROID STIM HORMONE: CPT

## 2022-03-15 PROCEDURE — S2900: CPT

## 2022-03-15 PROCEDURE — 82803 BLOOD GASES ANY COMBINATION: CPT

## 2022-03-15 PROCEDURE — 85730 THROMBOPLASTIN TIME PARTIAL: CPT

## 2022-03-15 PROCEDURE — 85610 PROTHROMBIN TIME: CPT

## 2022-03-15 PROCEDURE — 71046 X-RAY EXAM CHEST 2 VIEWS: CPT | Mod: 26

## 2022-03-15 PROCEDURE — 82550 ASSAY OF CK (CPK): CPT

## 2022-03-15 PROCEDURE — 81003 URINALYSIS AUTO W/O SCOPE: CPT

## 2022-03-15 PROCEDURE — 87635 SARS-COV-2 COVID-19 AMP PRB: CPT

## 2022-03-15 PROCEDURE — C1887: CPT

## 2022-03-15 PROCEDURE — 81001 URINALYSIS AUTO W/SCOPE: CPT

## 2022-03-15 PROCEDURE — 94640 AIRWAY INHALATION TREATMENT: CPT

## 2022-03-15 PROCEDURE — C1874: CPT

## 2022-03-15 PROCEDURE — 93321 DOPPLER ECHO F-UP/LMTD STD: CPT

## 2022-03-15 PROCEDURE — 86850 RBC ANTIBODY SCREEN: CPT

## 2022-03-15 PROCEDURE — C1769: CPT

## 2022-03-15 PROCEDURE — 99285 EMERGENCY DEPT VISIT HI MDM: CPT | Mod: 25

## 2022-03-15 PROCEDURE — 86891 AUTOLOGOUS BLOOD OP SALVAGE: CPT

## 2022-03-15 PROCEDURE — C1725: CPT

## 2022-03-15 PROCEDURE — 86803 HEPATITIS C AB TEST: CPT

## 2022-03-15 PROCEDURE — 80048 BASIC METABOLIC PNL TOTAL CA: CPT

## 2022-03-15 PROCEDURE — 82962 GLUCOSE BLOOD TEST: CPT

## 2022-03-15 RX ORDER — ATORVASTATIN CALCIUM 80 MG/1
1 TABLET, FILM COATED ORAL
Qty: 30 | Refills: 0
Start: 2022-03-15 | End: 2022-04-13

## 2022-03-15 RX ORDER — METOPROLOL TARTRATE 50 MG
1 TABLET ORAL
Qty: 60 | Refills: 0
Start: 2022-03-15 | End: 2022-04-13

## 2022-03-15 RX ORDER — FENOFIBRATE,MICRONIZED 130 MG
1 CAPSULE ORAL
Qty: 30 | Refills: 0
Start: 2022-03-15 | End: 2022-04-13

## 2022-03-15 RX ORDER — FUROSEMIDE 40 MG
1 TABLET ORAL
Qty: 14 | Refills: 0
Start: 2022-03-15 | End: 2022-03-28

## 2022-03-15 RX ORDER — ACETAMINOPHEN 500 MG
2 TABLET ORAL
Qty: 56 | Refills: 0
Start: 2022-03-15 | End: 2022-03-21

## 2022-03-15 RX ORDER — POTASSIUM CHLORIDE 20 MEQ
10 PACKET (EA) ORAL ONCE
Refills: 0 | Status: COMPLETED | OUTPATIENT
Start: 2022-03-15 | End: 2022-03-15

## 2022-03-15 RX ORDER — PANTOPRAZOLE SODIUM 20 MG/1
1 TABLET, DELAYED RELEASE ORAL
Qty: 30 | Refills: 0
Start: 2022-03-15 | End: 2022-04-13

## 2022-03-15 RX ORDER — FOLIC ACID 0.8 MG
1 TABLET ORAL
Qty: 30 | Refills: 0
Start: 2022-03-15 | End: 2022-04-13

## 2022-03-15 RX ORDER — ASPIRIN/CALCIUM CARB/MAGNESIUM 324 MG
1 TABLET ORAL
Qty: 30 | Refills: 0
Start: 2022-03-15 | End: 2022-04-13

## 2022-03-15 RX ORDER — FUROSEMIDE 40 MG
20 TABLET ORAL DAILY
Refills: 0 | Status: DISCONTINUED | OUTPATIENT
Start: 2022-03-15 | End: 2022-03-15

## 2022-03-15 RX ORDER — POTASSIUM CHLORIDE 20 MEQ
1 PACKET (EA) ORAL
Qty: 14 | Refills: 0
Start: 2022-03-15 | End: 2022-03-28

## 2022-03-15 RX ORDER — ATORVASTATIN CALCIUM 80 MG/1
1 TABLET, FILM COATED ORAL
Qty: 0 | Refills: 0 | DISCHARGE

## 2022-03-15 RX ORDER — METOPROLOL TARTRATE 50 MG
25 TABLET ORAL EVERY 12 HOURS
Refills: 0 | Status: DISCONTINUED | OUTPATIENT
Start: 2022-03-15 | End: 2022-03-15

## 2022-03-15 RX ORDER — POTASSIUM CHLORIDE 20 MEQ
10 PACKET (EA) ORAL DAILY
Refills: 0 | Status: DISCONTINUED | OUTPATIENT
Start: 2022-03-15 | End: 2022-03-15

## 2022-03-15 RX ORDER — FOLIC ACID 0.8 MG
1 TABLET ORAL
Qty: 0 | Refills: 0 | DISCHARGE

## 2022-03-15 RX ORDER — CLOPIDOGREL BISULFATE 75 MG/1
1 TABLET, FILM COATED ORAL
Qty: 30 | Refills: 0
Start: 2022-03-15 | End: 2022-04-13

## 2022-03-15 RX ORDER — METOPROLOL TARTRATE 50 MG
12.5 TABLET ORAL ONCE
Refills: 0 | Status: COMPLETED | OUTPATIENT
Start: 2022-03-15 | End: 2022-03-15

## 2022-03-15 RX ADMIN — Medication 12.5 MILLIGRAM(S): at 06:36

## 2022-03-15 RX ADMIN — Medication 1 MILLIGRAM(S): at 13:33

## 2022-03-15 RX ADMIN — Medication 650 MILLIGRAM(S): at 00:55

## 2022-03-15 RX ADMIN — CLOPIDOGREL BISULFATE 75 MILLIGRAM(S): 75 TABLET, FILM COATED ORAL at 13:33

## 2022-03-15 RX ADMIN — PANTOPRAZOLE SODIUM 40 MILLIGRAM(S): 20 TABLET, DELAYED RELEASE ORAL at 06:38

## 2022-03-15 RX ADMIN — Medication 81 MILLIGRAM(S): at 13:33

## 2022-03-15 RX ADMIN — SODIUM CHLORIDE 3 MILLILITER(S): 9 INJECTION INTRAMUSCULAR; INTRAVENOUS; SUBCUTANEOUS at 06:52

## 2022-03-15 RX ADMIN — Medication 145 MILLIGRAM(S): at 13:33

## 2022-03-15 RX ADMIN — Medication 12.5 MILLIGRAM(S): at 10:32

## 2022-03-15 RX ADMIN — LIDOCAINE 1 PATCH: 4 CREAM TOPICAL at 00:52

## 2022-03-15 RX ADMIN — Medication 650 MILLIGRAM(S): at 13:51

## 2022-03-15 RX ADMIN — Medication 650 MILLIGRAM(S): at 01:13

## 2022-03-15 RX ADMIN — SODIUM CHLORIDE 3 MILLILITER(S): 9 INJECTION INTRAMUSCULAR; INTRAVENOUS; SUBCUTANEOUS at 13:18

## 2022-03-15 RX ADMIN — HEPARIN SODIUM 5000 UNIT(S): 5000 INJECTION INTRAVENOUS; SUBCUTANEOUS at 06:36

## 2022-03-15 RX ADMIN — Medication 10 MILLIEQUIVALENT(S): at 08:06

## 2022-03-15 RX ADMIN — Medication 650 MILLIGRAM(S): at 06:52

## 2022-03-15 RX ADMIN — Medication 650 MILLIGRAM(S): at 06:38

## 2022-03-15 NOTE — DISCHARGE NOTE NURSING/CASE MANAGEMENT/SOCIAL WORK - NSDCPEFALRISK_GEN_ALL_CORE
For information on Fall & Injury Prevention, visit: [email or web address] OR  [email or web address] OR  https://www.cdc.gov/steadi/patient.html

## 2022-03-15 NOTE — DISCHARGE NOTE PROVIDER - CARE PROVIDERS DIRECT ADDRESSES
[email or web address]

## 2022-03-15 NOTE — DISCHARGE NOTE PROVIDER - HOSPITAL COURSE
66 year old male, former smoker, with PMHx of HTN, HLD, Hep C, BPH, COVID 2021, Class III Angina with known severe 2 vessel CAD s/p PCI 10/202, known to Dr. Hauser as an outpatient but cancelled surgery multiple times since October, who presented to [facility] ED 3/9/22 complaining of chest pain and GARCIA. He was admitted under Dr. Hauser for surgical evaluation and was deemed a good candidate for intervention. On 3/10 he underwent an uncomplicated MIDCAB (LIMA-LAD). Post operatively he was brought to CTICU extubated, in stable condition. He got out of bed to chair and became orthostatic for which he was given volume. Symptoms improved. POD1 he was de-lined and transferred to the stepdown unit as floor care. Pleural chest tube was removed after successful clamp trial. POD2 patient febrile, asymptomatic. POD2 clamp trial and eventual removal of mediastinal porfirio. POD3 patient febrile overnight, UA negative. POD 4 underwent PCI of ramus with Dr. Rivero. On POD5 patient feels well and excited to go home. Tolerating PO diet, ambulating independently, satting well on room air. Per Dr. Hauser he is medically stable for discharge home today, 3/15.    Over 35 minutes was spent with the patient reviewing the discharge material including medications, follow up appointments, recovery, concerning symptoms, and how to contact their health care providers if they have questions. 66 year old male, former smoker, with PMHx of HTN, HLD, Hep C, BPH, COVID 2021, Class III Angina with known severe 2 vessel CAD s/p PCI 10/202, known to Dr. Hauser as an outpatient but cancelled surgery multiple times since October, who presented to [facility] ED 3/9/22 complaining of chest pain and GARCIA. He was admitted under Dr. Hauser for surgical evaluation and was deemed a good candidate for intervention. On 3/10 he underwent an uncomplicated MIDCAB (LIMA-LAD). Post operatively he was brought to CTICU extubated, in stable condition. He got out of bed to chair and became orthostatic for which he was given volume. Symptoms improved. POD1 he was de-lined and transferred to the stepdown unit as floor care. Pleural chest tube was removed after successful clamp trial. POD2 patient febrile, asymptomatic. POD2 clamp trial and eventual removal of mediastinal porfirio. POD3 patient febrile overnight, UA negative. POD 4 underwent PCI of ramus with Dr. Rivero. On POD5 patient feels well and excited to go home. Tolerating PO diet, ambulating independently, satting well on room air. Per Dr. Hauser he is medically stable for discharge home today, 3/15.    CABG  Aspirin               [ x ] Yes  [  ] Contraindicated, Reason_______________________________  Beta-Blocker     [ x ] Yes  [  ]Contraindicated, Reason_______________________________  Statin                 [ x ] Yes  [  ] Contraindicated, Reason_______________________________    Over 35 minutes was spent with the patient reviewing the discharge material including medications, follow up appointments, recovery, concerning symptoms, and how to contact their health care providers if they have questions.

## 2022-03-15 NOTE — PROGRESS NOTE ADULT - ASSESSMENT
Vincent Hauser)  Cardiovascular Surgery  [address] [place] [address]  Phone: [phone]  Fax: [phone]  Scheduled Appointment: 03/22/2022 09:30 AM    Pravin Rivero  CARDIOVASCULAR DISEASE  [address] [place] [address]  Phone: [phone]  Fax: [phone]  Scheduled Appointment: 03/29/2022 10:00 AM

## 2022-03-15 NOTE — PROGRESS NOTE ADULT - PROVIDER SPECIALTY LIST ADULT
CT Surgery
CT Surgery
Intervent Cardiology
CT Surgery
Critical Care
Critical Care
CT Surgery
CT Surgery

## 2022-03-15 NOTE — DISCHARGE NOTE PROVIDER - NSDCMRMEDTOKEN_GEN_ALL_CORE_FT
acetaminophen 325 mg oral tablet: 2 tab(s) orally every 6 hours  aspirin 81 mg oral delayed release tablet: 1 tab(s) orally once a day  atorvastatin 20 mg oral tablet: 1 tab(s) orally once a day (at bedtime)  clopidogrel 75 mg oral tablet: 1 tab(s) orally once a day  cyclobenzaprine 10 mg oral tablet: 1 tab(s) orally 2 times a day, As Needed  fenofibrate 145 mg oral tablet: 1 tab(s) orally once a day  folic acid 1 mg oral tablet: 1 tab(s) orally once a day  furosemide 20 mg oral tablet: 1 tab(s) orally once a day, TAKE FOR 5 DAYS (3/15-3/20)  metoprolol tartrate 25 mg oral tablet: 1 tab(s) orally every 12 hours  pantoprazole 40 mg oral delayed release tablet: 1 tab(s) orally once a day (before a meal)  potassium chloride 10 mEq oral tablet, extended release: 1 tab(s) orally once a day, ONLY TAKE WITH FUROSEMIDE (LASIX) 3/15 TO 3/20

## 2022-03-15 NOTE — DISCHARGE NOTE PROVIDER - CARE PROVIDER_API CALL
Vincent Hauser)  Cardiovascular Surgery  [address] [place] [address]  Phone: [phone]  Fax: [phone]  Follow Up Time: 2 weeks    Pravin Rivero  CARDIOVASCULAR DISEASE  [address] [place] [address]  Phone: [phone]  Fax: [phone]  Follow Up Time: 2 weeks   Vincent Hausre)  Cardiovascular Surgery  [address] [place] [address]  Phone: [phone]  Fax: [phone]  Scheduled Appointment: 03/22/2022 09:30 AM    Pravin Rivero  CARDIOVASCULAR DISEASE  [address] [place] [address]  Phone: [phone]  Fax: [phone]  Scheduled Appointment: 03/29/2022 10:00 AM

## 2022-03-15 NOTE — DISCHARGE NOTE PROVIDER - NSDCCPTREATMENT_GEN_ALL_CORE_FT
PRINCIPAL PROCEDURE  Procedure: Minimally invasive direct coronary artery bypass (MIDCAB) with transesophageal echocardiography (LIZBETH)  Findings and Treatment:

## 2022-03-15 NOTE — PROGRESS NOTE ADULT - SUBJECTIVE AND OBJECTIVE BOX
Patient discussed on morning rounds with Dr. Hauser    Operation / Date: 3/10 MIDCAB, 3/14 PCI w/ stent to ramus    Surgeon: Dr. Hauser    Referring Physician: Dr. Rivero    SUBJECTIVE ASSESSMENT:  66y Male seen and examined. Patient feels well and excited to go home.    HOSPITAL COURSE:  66 year old male, former smoker, with PMHx of HTN, HLD, Hep C, BPH, COVID 2021, Class III Angina with known severe 2 vessel CAD s/p PCI 10/202, known to Dr. Hauser as an outpatient but cancelled surgery multiple times since October, who presented to [facility] ED 3/9/22 complaining of chest pain and GARCIA. He was admitted under Dr. Hauser for surgical evaluation and was deemed a good candidate for intervention. On 3/10 he underwent an uncomplicated MIDCAB (LIMA-LAD). Post operatively he was brought to CTICU extubated, in stable condition. He got out of bed to chair and became orthostatic for which he was given volume. Symptoms improved. POD1 he was de-lined and transferred to the stepdown unit as floor care. Pleural chest tube was removed after successful clamp trial. POD2 patient febrile, asymptomatic. POD2 clamp trial and eventual removal of mediastinal porfirio. POD3 patient febrile overnight, UA negative. POD 4 underwent PCI of ramus with Dr. Rivero. On POD5 patient feels well and excited to go home. Tolerating PO diet, ambulating independently, satting well on room air. Per Dr. Hauser he is medically stable for discharge home today, 3/15.    Vital Signs Last 24 Hrs  T(C): 36.8 (15 Mar 2022 09:39), Max: 38.1 (14 Mar 2022 21:38)  T(F): 98.3 (15 Mar 2022 09:39), Max: 100.5 (14 Mar 2022 21:38)  HR: 72 (15 Mar 2022 09:05) (67 - 84)  BP: 147/76 (15 Mar 2022 09:05) (117/68 - 157/80)  BP(mean): 106 (15 Mar 2022 09:05) (82 - 108)  RR: 18 (15 Mar 2022 09:05) (16 - 18)  SpO2: 95% (15 Mar 2022 09:05) (93% - 97%)    EPICARDIAL WIRES REMOVED: NA.  TIE DOWNS REMOVED: Yes.    PHYSICAL EXAM:    General: NAD, sitting comfortably in chair, conversing appropriately  Neurological: alert and oriented, UE and LE strength equal b/l, facial symmetry present  Cardiovascular: RRR, Clear S1 and S2, no murmurs appreciated  Respiratory: chest expansion symmetrical, CTA b/l, no wheezing noted  Gastrointestinal: +BS, soft, NT, ND  Extremities: moving spontaneously, no calf tenderness or edema.  Vascular: warm, well perfused. DP/PT pulses palpable b/l.  Incisions: L thoracotomy covered in dermabond, C/D/I    LABS:                        12.8   11.62 )-----------( 299      ( 15 Mar 2022 05:02 )             38.4         PT/INR - ( 14 Mar 2022 05:57 )   PT: 12.1 sec;   INR: 1.02          PTT - ( 14 Mar 2022 05:57 )  PTT:36.4 sec    03-15    139  |  107  |  18  ----------------------------<  95  3.9   |  19<L>  |  1.01    Ca    8.9      15 Mar 2022 05:02  Mg     2.2     03-15    TPro  6.9  /  Alb  3.8  /  TBili  0.5  /  DBili  x   /  AST  46<H>  /  ALT  40  /  AlkPhos  65  03-14          Discharge CXR:  < from: Xray Chest 1 View- PORTABLE-Routine (Xray Chest 1 View- PORTABLE-Routine in AM.) (03.13.22 @ 05:20) >  INTERPRETATION:  Clinical history: Chest pain    Frontal examination of the chest demonstrates the heart to be within   normal limits intransverse diameter. Mild elevation right hemidiaphragm.   Left basilar infiltrates. Small left effusion. Degenerative changes   thoracic spine.    IMPRESSION: Left basilar infiltrates. Small left effusion      3/15/22 CXR: awaiting official read. possible small left effusion.         Patient discussed on morning rounds with Dr. Hauser    Operation / Date: 3/10 MIDCAB, 3/14 PCI w/ stent to ramus    Surgeon: Dr. Hauser    Referring Physician: Dr. Rivero    SUBJECTIVE ASSESSMENT:  66y Male seen and examined. Patient feels well and excited to go home. Tolerating PO diet, pulling 1000cc on IS. Denies lightheadedness, headache, CP, palpitations, SOB, abdominal pain, nausea, vomiting, fever, chills.    HOSPITAL COURSE:  66 year old male, former smoker, with PMHx of HTN, HLD, Hep C, BPH, COVID 2021, Class III Angina with known severe 2 vessel CAD s/p PCI 10/202, known to Dr. Hauser as an outpatient but cancelled surgery multiple times since October, who presented to [facility] ED 3/9/22 complaining of chest pain and GARCIA. He was admitted under Dr. Hauser for surgical evaluation and was deemed a good candidate for intervention. On 3/10 he underwent an uncomplicated MIDCAB (LIMA-LAD). Post operatively he was brought to CTICU extubated, in stable condition. He got out of bed to chair and became orthostatic for which he was given volume. Symptoms improved. POD1 he was de-lined and transferred to the stepdown unit as floor care. Pleural chest tube was removed after successful clamp trial. POD2 patient febrile, asymptomatic. POD2 clamp trial and eventual removal of mediastinal porfirio. POD3 patient febrile overnight, UA negative. POD 4 underwent PCI of ramus with Dr. Rivero. On POD5 patient feels well and excited to go home. Tolerating PO diet, ambulating independently, satting well on room air. Per Dr. Hauser he is medically stable for discharge home today, 3/15.    Vital Signs Last 24 Hrs  T(C): 36.8 (15 Mar 2022 09:39), Max: 38.1 (14 Mar 2022 21:38)  T(F): 98.3 (15 Mar 2022 09:39), Max: 100.5 (14 Mar 2022 21:38)  HR: 72 (15 Mar 2022 09:05) (67 - 84)  BP: 147/76 (15 Mar 2022 09:05) (117/68 - 157/80)  BP(mean): 106 (15 Mar 2022 09:05) (82 - 108)  RR: 18 (15 Mar 2022 09:05) (16 - 18)  SpO2: 95% (15 Mar 2022 09:05) (93% - 97%)    EPICARDIAL WIRES REMOVED: NA.  TIE DOWNS REMOVED: Yes.    PHYSICAL EXAM:    General: NAD, sitting comfortably in chair, conversing appropriately  Neurological: alert and oriented, UE and LE strength equal b/l, facial symmetry present  Cardiovascular: RRR, Clear S1 and S2, no murmurs appreciated  Respiratory: chest expansion symmetrical, CTA b/l, no wheezing noted  Gastrointestinal: +BS, soft, NT, ND  Extremities: moving spontaneously, no calf tenderness or edema.  Vascular: warm, well perfused. DP/PT pulses palpable b/l.  Incisions: L thoracotomy covered in dermabond, C/D/I    LABS:                        12.8   11.62 )-----------( 299      ( 15 Mar 2022 05:02 )             38.4         PT/INR - ( 14 Mar 2022 05:57 )   PT: 12.1 sec;   INR: 1.02          PTT - ( 14 Mar 2022 05:57 )  PTT:36.4 sec    03-15    139  |  107  |  18  ----------------------------<  95  3.9   |  19<L>  |  1.01    Ca    8.9      15 Mar 2022 05:02  Mg     2.2     03-15    TPro  6.9  /  Alb  3.8  /  TBili  0.5  /  DBili  x   /  AST  46<H>  /  ALT  40  /  AlkPhos  65  03-14          Discharge CXR:  < from: Xray Chest 1 View- PORTABLE-Routine (Xray Chest 1 View- PORTABLE-Routine in AM.) (03.13.22 @ 05:20) >  INTERPRETATION:  Clinical history: Chest pain    Frontal examination of the chest demonstrates the heart to be within   normal limits intransverse diameter. Mild elevation right hemidiaphragm.   Left basilar infiltrates. Small left effusion. Degenerative changes   thoracic spine.    IMPRESSION: Left basilar infiltrates. Small left effusion      3/15/22 CXR: awaiting official read. possible small left effusion.

## 2022-03-15 NOTE — DISCHARGE NOTE PROVIDER - PROVIDER TOKENS
PROVIDER:[TOKEN:[[id number]],FOLLOWUP:[2 weeks]],PROVIDER:[TOKEN:[[id number]],FOLLOWUP:[2 weeks]] PROVIDER:[TOKEN:[[id number]],SCHEDULEDAPPT:[03/22/2022],SCHEDULEDAPPTTIME:[09:30 AM]],PROVIDER:[TOKEN:[[id number]],SCHEDULEDAPPT:[03/29/2022],SCHEDULEDAPPTTIME:[10:00 AM]]

## 2022-03-15 NOTE — DISCHARGE NOTE NURSING/CASE MANAGEMENT/SOCIAL WORK - PATIENT PORTAL LINK FT
You can access the FollowMyHealth Patient Portal offered by [facility] by registering at the following website: [email or web address]. By joining [facility]’s FollowMyHealth portal, you will also be able to view your health information using other applications (apps) compatible with our system.

## 2022-03-16 RX ORDER — METOPROLOL SUCCINATE 25 MG/1
25 TABLET, EXTENDED RELEASE ORAL DAILY
Refills: 3 | Status: DISCONTINUED | COMMUNITY
End: 2022-03-16

## 2022-03-16 RX ORDER — METOPROLOL TARTRATE 25 MG/1
25 TABLET, FILM COATED ORAL
Refills: 0 | Status: ACTIVE | COMMUNITY

## 2022-03-16 RX ORDER — NAPROXEN 500 MG/1
500 TABLET ORAL
Refills: 0 | Status: DISCONTINUED | COMMUNITY
End: 2022-03-16

## 2022-03-16 RX ORDER — FENOFIBRATE 160 MG/1
160 TABLET ORAL DAILY
Qty: 30 | Refills: 0 | Status: DISCONTINUED | COMMUNITY
End: 2022-03-16

## 2022-03-16 RX ORDER — ACETAMINOPHEN 325 MG/1
325 TABLET ORAL
Refills: 0 | Status: ACTIVE | COMMUNITY

## 2022-03-17 ENCOUNTER — APPOINTMENT (OUTPATIENT)
Dept: CARE COORDINATION | Facility: HOME HEALTH | Age: 67
End: 2022-03-17
Payer: MEDICAID

## 2022-03-17 VITALS
BODY MASS INDEX: 23.63 KG/M2 | WEIGHT: 142 LBS | OXYGEN SATURATION: 95 % | HEART RATE: 67 BPM | SYSTOLIC BLOOD PRESSURE: 140 MMHG | DIASTOLIC BLOOD PRESSURE: 80 MMHG | RESPIRATION RATE: 14 BRPM

## 2022-03-17 DIAGNOSIS — Z95.1 PRESENCE OF AORTOCORONARY BYPASS GRAFT: ICD-10-CM

## 2022-03-17 DIAGNOSIS — I25.10 ATHEROSCLEROTIC HEART DISEASE OF NATIVE CORONARY ARTERY W/OUT ANGINA PECTORIS: ICD-10-CM

## 2022-03-17 PROBLEM — Z95.828 PRESENCE OF STENT IN ARTERY: Status: ACTIVE | Noted: 2022-03-17

## 2022-03-17 PROCEDURE — 99024 POSTOP FOLLOW-UP VISIT: CPT

## 2022-03-17 RX ORDER — PANTOPRAZOLE 40 MG/1
40 TABLET, DELAYED RELEASE ORAL DAILY
Refills: 0 | Status: ACTIVE | COMMUNITY

## 2022-03-17 RX ORDER — SENNOSIDES 8.6 MG TABLETS 8.6 MG/1
8.6 TABLET ORAL AT BEDTIME
Qty: 28 | Refills: 0 | Status: ACTIVE | COMMUNITY
Start: 2022-03-17 | End: 1900-01-01

## 2022-03-17 RX ORDER — FENOFIBRATE 145 MG/1
145 TABLET, COATED ORAL DAILY
Refills: 0 | Status: ACTIVE | COMMUNITY

## 2022-03-17 RX ORDER — POLYETHYLENE GLYCOL 3350 17 G/17G
17 POWDER, FOR SOLUTION ORAL DAILY
Qty: 30 | Refills: 0 | Status: ACTIVE | COMMUNITY
Start: 2022-03-17 | End: 1900-01-01

## 2022-03-17 RX ORDER — CLOPIDOGREL BISULFATE 75 MG/1
75 TABLET, FILM COATED ORAL DAILY
Qty: 1 | Refills: 6 | Status: ACTIVE | COMMUNITY

## 2022-03-17 RX ORDER — OXYCODONE 5 MG/1
5 TABLET ORAL EVERY 6 HOURS
Qty: 20 | Refills: 0 | Status: ACTIVE | COMMUNITY
Start: 2022-03-17 | End: 1900-01-01

## 2022-03-17 NOTE — HISTORY OF PRESENT ILLNESS
[FreeTextEntry1] : This pt. is enrolled in the Follow Your Heart (FYH) program through [employer]. Home visit made to Mr. GARCIA, a pt. of Dr. Murtaza james/karie PAREDES on 3/10/22. Discharged home from [facility].\par \par Pt. seen for post-hospitalization transitional care management and post-surgical follow-up. Arrived to pt. home. Pt. observed grimacing with slight movement - reports 8/10 incisional chest pain not relieved w/ PO Tylenol. Pt has all medications per DC instructions and is taking them as prescribed. Reports hourly IS use, pulling in 1500 cc.  Denies fever, SOB, LE swelling, or palpitations. Caretaker present for translation. Awaiting HC start of care. Abnormal thyroid blood test

## 2022-03-17 NOTE — PHYSICAL EXAM
[] : no respiratory distress [Respiration, Rhythm And Depth] : normal respiratory rhythm and effort [Exaggerated Use Of Accessory Muscles For Inspiration] : no accessory muscle use [Auscultation Breath Sounds / Voice Sounds] : lungs were clear to auscultation bilaterally [Apical Impulse] : the apical impulse was normal [Heart Sounds] : normal S1 and S2 [Murmurs] : no murmurs [2+] : left 2+ [Bowel Sounds] : normal bowel sounds [Abdomen Soft] : soft [Abdomen Tenderness] : non-tender [Abnormal Walk] : normal gait [Oriented To Time, Place, And Person] : oriented to person, place, and time [Affect] : the affect was normal [FreeTextEntry1] : Left mini thoracotomy site without erythema or drainage, with edges well-approximated. CT sites with mild ecchymosis, with edges well-approximated, no drainage visualized

## 2022-03-17 NOTE — REVIEW OF SYSTEMS
[Feeling Tired] : feeling tired [SOB on Exertion] : shortness of breath during exertion [Fever] : no fever [Heart Rate Is Slow] : the heart rate was not slow [Heart Rate Is Fast] : the heart rate was not fast [Palpitations] : no palpitations [Lower Ext Edema] : no extremity edema [Shortness Of Breath] : no shortness of breath [Cough] : no cough [Abdominal Pain] : no abdominal pain [Constipation] : no constipation [Dizziness] : no dizziness [FreeTextEntry5] : Incisional chest pain

## 2022-03-19 DIAGNOSIS — N40.0 BENIGN PROSTATIC HYPERPLASIA WITHOUT LOWER URINARY TRACT SYMPTOMS: ICD-10-CM

## 2022-03-19 DIAGNOSIS — Z86.19 PERSONAL HISTORY OF OTHER INFECTIOUS AND PARASITIC DISEASES: ICD-10-CM

## 2022-03-19 DIAGNOSIS — Z86.16 PERSONAL HISTORY OF COVID-19: ICD-10-CM

## 2022-03-19 DIAGNOSIS — E78.5 HYPERLIPIDEMIA, UNSPECIFIED: ICD-10-CM

## 2022-03-19 DIAGNOSIS — I25.119 ATHEROSCLEROTIC HEART DISEASE OF NATIVE CORONARY ARTERY WITH UNSPECIFIED ANGINA PECTORIS: ICD-10-CM

## 2022-03-19 DIAGNOSIS — Z87.891 PERSONAL HISTORY OF NICOTINE DEPENDENCE: ICD-10-CM

## 2022-03-19 DIAGNOSIS — J90 PLEURAL EFFUSION, NOT ELSEWHERE CLASSIFIED: ICD-10-CM

## 2022-03-19 DIAGNOSIS — I10 ESSENTIAL (PRIMARY) HYPERTENSION: ICD-10-CM

## 2022-03-19 DIAGNOSIS — R50.9 FEVER, UNSPECIFIED: ICD-10-CM

## 2022-03-22 ENCOUNTER — APPOINTMENT (OUTPATIENT)
Dept: CARDIOTHORACIC SURGERY | Facility: CLINIC | Age: 67
End: 2022-03-22
Payer: MEDICAID

## 2022-03-22 VITALS
HEART RATE: 54 BPM | OXYGEN SATURATION: 98 % | SYSTOLIC BLOOD PRESSURE: 142 MMHG | WEIGHT: 138 LBS | HEIGHT: 65 IN | TEMPERATURE: 97.4 F | DIASTOLIC BLOOD PRESSURE: 68 MMHG | RESPIRATION RATE: 17 BRPM | BODY MASS INDEX: 22.99 KG/M2

## 2022-03-22 DIAGNOSIS — Z95.828 PRESENCE OF OTHER VASCULAR IMPLANTS AND GRAFTS: ICD-10-CM

## 2022-03-22 PROCEDURE — 99024 POSTOP FOLLOW-UP VISIT: CPT

## 2022-03-22 RX ORDER — POTASSIUM CHLORIDE 750 MG/1
10 TABLET, FILM COATED, EXTENDED RELEASE ORAL DAILY
Qty: 30 | Refills: 0 | Status: COMPLETED | COMMUNITY
End: 2022-03-22

## 2022-03-22 RX ORDER — FUROSEMIDE 20 MG/1
20 TABLET ORAL DAILY
Qty: 30 | Refills: 0 | Status: COMPLETED | COMMUNITY
End: 2022-03-22

## 2022-03-23 ENCOUNTER — TRANSCRIPTION ENCOUNTER (OUTPATIENT)
Age: 67
End: 2022-03-23

## 2022-04-13 ENCOUNTER — TRANSCRIPTION ENCOUNTER (OUTPATIENT)
Age: 67
End: 2022-04-13

## 2023-02-24 NOTE — H&P ADULT - NSHPPOAPULMEMBOLUS_GEN_A_CORE
Neurovascular:  Called by medical resident for MRI findings    < from: MR Head No Cont (02.22.23 @ 19:36) >    IMPRESSION:    1.  MRI brain: Scattered punctate acute infarcts in multiple vascular   territories suggestive of a central embolic etiology.    2.  Mild chronic microvascular changes and scattered chronic lacunar type   infarcts.    3.  MRI orbits: Unremarkable study.    < end of copied text >    Discussed with Dr. 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     Suggestion:  Telemetry monitoring  Recommend EMILE and Loop recorder  B/L LE dopplers.   If that workup is negative can be discharged from a stroke perspective on Aspirin 81 mg daily and Plavix 75 mg po daily(Plavix should be for only 21 days)  At that time follow up in the stroke clinic in two weeks.   Call with questions or change in mental status. [phone]
POST OPERATIVE PROCEDURAL DOCUMENTATION  PRE-OP DIAGNOSIS: CVA    POST-OP DIAGNOSIS: No evidence of cardio-embolic source of CVA    PROCEDURE: Transesophageal echocardiogram    Primary Physician: Dr. Bernal  Fellow: Dr. Angeles    ANESTHESIA TYPE  [  ] General Anesthesia  [ x ] Conscious Sedation  [  ] Local/Regional    CONDITION  [  ] Critical  [  ] Serious  [  ] Fair  [X] Good    SPECIMENS REMOVED (IF APPLICABLE): N/A    IMPLANTS (IF APPLICABLE): None    ESTIMATED BLOOD LOSS: None    COMPLICATIONS: None      FINDINGS:    After risks and benefits of procedures were explained, informed consent was obtained and placed in chart. Refer to Anesthesia note for sedation details.  The EMILE probe was passed into the esophagus without difficulty.  Transesophageal images were obtained.  The EMILE probe was removed without difficulty and examined.  There was no evidence for bleeding.  The patient tolerated the procedure well without any immediate EMILE-related complications.      Preliminary Findings:  LA: Normal  CHANDRIKA: Left atrial appendage was clear of clot and smoke.  LV: LVEF was estimated to be normal  MV: Trace MR  AV: No evidence of AI, no evidence of AS.   RA: Normal  TV: Trace TR.   PV: no PI.   IAS: no shunt by color doppler and bubble study  There was mild, non-mobile atheroma seen in the thoracic aorta.     DIAGNOSIS/IMPRESSION:  - No evidence of cardio-embolic source of CVA    PLAN OF CARE:  - Return to inpatient bed  - Continue care as per neurology
no

## 2023-05-01 NOTE — ASSESSMENT
[FreeTextEntry1] : Pt. is a 66 year old male, former smoker, with PMHx of HTN, HLD, Hep C, BPH, COVID 2021 and Class III Angina with known severe 2 vessel CAD s/p PCI 10/202.  On 3/10 pt. underwent an uncomplicated MIDCAB (CARNEY-LAD) w/ Dr. Hauser. Pt. also underwent PCI of ramus w/ Dr. Rivero. Postop course significant for orthostasis and fever - UA negative. \par \par Pt. now with uncontrolled pain.
no

## 2024-01-25 RX ORDER — CHLORHEXIDINE GLUCONATE 213 G/1000ML
1 SOLUTION TOPICAL ONCE
Refills: 0 | Status: DISCONTINUED | OUTPATIENT
Start: 2024-04-15 | End: 2024-04-16

## 2024-01-25 RX ORDER — FOLIC ACID 0.8 MG
1 TABLET ORAL
Refills: 0 | DISCHARGE

## 2024-01-25 RX ORDER — PANTOPRAZOLE SODIUM 20 MG/1
1 TABLET, DELAYED RELEASE ORAL
Refills: 0 | DISCHARGE

## 2024-01-25 NOTE — H&P ADULT - HISTORY OF PRESENT ILLNESS
Cardiologist: Mat  Pharmacy:   Escort:    68 year old with PMHx of HLD, Hep C, positive FHx of CAD s/p MIDCAB PCI to RI on 3/2022  Cardiologist: Mat  Pharmacy:   Escort:    68 year old with PMHx of HLD, Hep C, positive FHx of CAD s/p MIDCAB PCI to RI on 3/2022 who presents to outpatient Dr. Rivero complaining of chest pain for the past one month. Described as pressure, located in substernal region. No radiation of pain. Pain is related to activity.     Pt ___ denies palpitations, orthopnea, PND, leg edema, n/v/d, fever, chills, hematochezia, hematemesis, melena, BRBPR and other symptoms.     [facility] 10/2021: oLAD and oRamus disease.   TTE 5/2022 (per MD note): EF=65-70%. Grade 1 diastolic dysfunction. Nodular calcification of the aortic valve  In light of CCS class XX anginal symptoms,      Cardiologist: Mat  Pharmacy:   Escort:    68 year old with PMHx of HLD, Hep C, positive FHx of CAD s/p MIDCAB PCI to RI on 3/2022 who presents to outpatient Dr. Rivero complaining of chest pain for the past one month. Described as pressure, located in substernal region. No radiation of pain. Pain is related to activity.     Pt ___ denies palpitations, orthopnea, PND, leg edema, n/v/d, fever, chills, hematochezia, hematemesis, melena, BRBPR and other symptoms.     [facility] 10/2021: oLAD and oRamus disease.   TTE 5/2022 (per MD note): EF=65-70%. Grade 1 diastolic dysfunction. Nodular calcification of the aortic valve  In light of CCS class XX anginal symptoms, pt presents with [facility]      Cardiologist: Dr. Rivero  Pharmacy:   Escort:    68 year old with PMHx of HLD, Hep C, positive FHx of CAD s/p MIDCAB PCI to RI in3/2022, mild carotid artery stenosis, who presents to outpatient Dr. Rivero complaining of chest pain for the past one month. Described as pressure, located in substernal region. No radiation of pain. Pain is related to activity.   Pt ___ denies palpitations, orthopnea, PND, leg edema, n/v/d, fever, chills, hematochezia, hematemesis, melena, BRBPR and other symptoms.     LHC 3/14/22: [facility] (75%), Findings: LM diffuse 30% stenosis, pLAD 75%, mLAD 75%, 70% apical LAD, LCx luminal, RCA luminal, patent LIMA- LAD  TTE 7/2023(per MD note): EF=65-70%. Grade 1 diastolic dysfunction with impaired relaxation and normal filling pressures, aortic valve calcification and mild aortic valve regurgitation.  Stress Echo 1/2024 (per MD note): Moderate hypokinesis in the mid-basal inferior walls.     In light of CCS class XX anginal symptoms and abnormal stress echo, pt presents for LHC with possible intervention if clinically indicated.     Cardiologist: Dr. Rivero  Pharmacy:   Escort:    68 year old with PMHx of HLD, Hep C, positive FHx of CAD s/p MIDCAB (LIMA to LAD) PCI to RI in 3/2022, mild carotid artery stenosis, who presents to outpatient Dr. Rivero complaining of chest pain for the past one month. Described as pressure, located in substernal region. No radiation of pain. Pain is related to activity.   Pt ___ denies palpitations, orthopnea, PND, leg edema, n/v/d, fever, chills, hematochezia, hematemesis, melena, BRBPR and other symptoms.     LHC 3/14/22: [facility] (75%), Findings: LM diffuse 30% stenosis, pLAD 75%, mLAD 75%, 70% apical LAD, LCx luminal, RCA luminal, patent LIMA- LAD  TTE 7/2023(per MD note): EF=65-70%. Grade 1 diastolic dysfunction with impaired relaxation and normal filling pressures, aortic valve calcification and mild aortic valve regurgitation.  Stress Echo 1/2024 (per MD note): Moderate hypokinesis in the mid-basal inferior walls.     In light of CCS class XX anginal symptoms and abnormal stress echo, pt presents for LHC with possible intervention if clinically indicated.     Cardiologist: Dr. Rivero  Pharmacy:   Escort:    **follows with neurology due to hx of white matter disease, confirm on arrival why and if hx of stroke**  68 year old with PMHx of HLD, Hep C, positive FHx of CAD s/p MIDCAB (LIMA to LAD) PCI to RI in 3/2022, mild carotid artery stenosis, who presents to outpatient Dr. Rivero complaining of chest pain for the past one month. Described as pressure, located in substernal region. No radiation of pain. Pain is related to activity.   Pt ___ denies palpitations, orthopnea, PND, leg edema, n/v/d, fever, chills, hematochezia, hematemesis, melena, BRBPR and other symptoms.     LHC 3/14/22: [facility] RI (75%), Findings: LM diffuse 30% stenosis, pLAD 75%, mLAD 75%, 70% apical LAD, LCx luminal, RCA luminal, patent LIMA- LAD  TTE 7/2023(per MD note): EF=65-70%. Grade 1 diastolic dysfunction with impaired relaxation and normal filling pressures, aortic valve calcification and mild aortic valve regurgitation.  Stress Echo 1/2024 (per MD note): Moderate hypokinesis in the mid-basal inferior walls.     In light of CCS class XX anginal symptoms and abnormal stress echo, pt presents for C with possible intervention if clinically indicated.

## 2024-01-25 NOTE — H&P ADULT - REASON FOR ADMISSION
----- Message from Tr Lebron Cardiac Cath

## 2024-04-10 VITALS — HEIGHT: 65 IN

## 2024-04-10 RX ORDER — DOCUSATE SODIUM 100 MG
1 CAPSULE ORAL
Refills: 0 | DISCHARGE

## 2024-04-10 NOTE — H&P ADULT - HISTORY OF PRESENT ILLNESS
Cardiologist: Dr. Rivero  Pharmacy:   Escort:    **follows with neurology due to hx of white matter disease, confirm on arrival why and if hx of stroke**    69 y/o with PMHx of HLD, Hep C, positive FHx of CAD s/p MIDCAB (LIMA to LAD) PCI to RI in 3/2022, mild carotid artery stenosis, who presents to outpatient Dr. Rivero complaining of chest pain for the past one month. Described as pressure, located in substernal region. No radiation of pain. Pain is related to activity.   Pt ___ denies palpitations, orthopnea, PND, leg edema, n/v/d, fever, chills, hematochezia, hematemesis, melena, BRBPR and other symptoms.     LHC (3/14/22): [facility] (75%), Findings: LM diffuse 30% stenosis, pLAD 75%, mLAD 75%, 70% apical LAD, LCx luminal, RCA luminal, patent LIMA- LAD  TTE (7/2023 - per MD note): EF=65-70%. Grade 1 diastolic dysfunction with impaired relaxation and normal filling pressures, aortic valve calcification and mild aortic valve regurgitation.  Stress Echo 1/2024 (per MD note): Moderate hypokinesis in the mid-basal inferior walls.     In light of risk factors, CCS class ___anginal symptoms and abnormal stress echo, pt presents for LHC with possible intervention if clinically indicated. Cardiologist: Dr. Rivero  Pharmacy:   Escort:    **follows with neurology due to hx of white matter disease, confirm on arrival why and if hx of stroke**    69 y/o with PMHx of HLD, Hep C, positive FHx of CAD s/p MIDCAB (LIMA to LAD) PCI to RI in 3/2022, mild carotid artery stenosis, who presents to outpatient Dr. Rivero complaining of chest pain for the past one month. Described as pressure, located in substernal region. No radiation of pain. Pain is related to activity.   Pt ___ denies palpitations, orthopnea, PND, leg edema, n/v/d, fever, chills, hematochezia, hematemesis, melena, BRBPR and other symptoms.     LHC (3/14/22): [facility] (75%), Findings: LM diffuse 30% stenosis, pLAD 75%, mLAD 75%, 70% apical LAD, LCx luminal, RCA luminal, patent LIMA- LAD  TTE (7/2023 - per MD note): EF=65-70%. Grade 1 diastolic dysfunction with impaired relaxation and normal filling pressures, aortic valve calcification and mild aortic valve regurgitation.  Stress Echo (1/2024 - per MD note): Moderate hypokinesis in the mid-basal inferior walls.     In light of risk factors, CCS class ___anginal symptoms and abnormal stress echo, pt presents for LHC with possible intervention if clinically indicated. Cardiologist: Dr. Rivero  Pharmacy:   Escort:    68M w/ FHx of CAD and PMHx of HTN, HLD, CAD s /p MIDCAB and subsequent PCI ([facility], 3/2022), mild carotid artery stenosis and HCV, initially presented to outpt cardiologist c/o exertional substernal chest pressure x 1 month, He denies any ___  palpitations, dizziness, syncope, diaphoresis, fatigue, LE edema, SOB, GARCIA, orthopnea, PND, N/V/D, abd pain, cough, congestion, fever, chills or recent sick contact.     LHC (3/14/22): [facility] (75%);  LM diffuse 30% stenosis, pLAD 75%, mLAD 75%, 70% apical LAD, LCx luminal, RCA luminal, patent LIMA- LAD  TTE (7/2023 - per MD note): EF=65-70%. G1DD and mild AR.  Stress Echo (1/2024 - per MD note): Moderate hypokinesis in the mid-basal inferior walls.     In light of risk factors, CCS class ___anginal symptoms and abnormal stress echo, pt presents for LHC with possible intervention if clinically indicated. Cardiologist: Dr. Rivero  Pharmacy:   Escort:    68M, former smoker, w/ FHx of CAD and PMHx of HTN, HLD, CAD s /p MIDCAB (3/2022), s/p subsequent PCI ([facility], 3/2022), mild carotid artery stenosis, BPH and HCV, initially presented to outpt cardiologist c/o exertional substernal chest pressure x 1 month, He denies any ___  palpitations, dizziness, syncope, diaphoresis, fatigue, LE edema, SOB, GARCIA, orthopnea, PND, N/V/D, abd pain, cough, congestion, fever, chills or recent sick contact.     [facility] (3/14/22): [facility] (75%);  LM diffuse 30% stenosis, pLAD 75%, mLAD 75%, 70% apical LAD, LCx luminal, RCA luminal, patent LIMA- LAD  TTE (7/2023 - per MD note): LVEF normal G1DD and mild AR.  Stress Echo (1/2024 - per MD note): Moderate hypokinesis in the mid-basal inferior walls.     In light of pts risk factors, CCS class III anginal symptoms and abnormal stress echo, pt now presents to [facility] for recommended cardiac catheterization with possible intervention if clinically indicated.  Cardiologist: Dr. Rivero  Pharmacy - [facility]  Escort - Daughter    68M, former smoker, w/ FHx of CAD and PMHx of HTN, HLD, CAD s /p MIDCAB (3/2022), s/p subsequent PCI ([facility], 3/2022), mild carotid artery stenosis, BPH and HCV, initially presented to outpt cardiologist c/o substernal chest pressure w/ minimal exertion x 1 month. He states since PCI in 2022 his CP had resolved for a few months, and intermittent had chest pressure w/ prolonged exertion, however in the past month symptoms occur w/ 5 steps, similar to prior PCI. He denies any palpitations, dizziness, syncope, diaphoresis, fatigue, LE edema, GARCIA, orthopnea, PND, N/V, fever, chills or recent sick contact.     Stress Echo (1/2024 - per MD note): Moderate hypokinesis in the mid-basal inferior walls.   TTE (7/2023 - per MD note): LVEF normal G1DD and mild AR.  [facility] (3/14/22): BR[facility]ON [facility] (75%);  LM diffuse 30% stenosis, pLAD 75%, mLAD 75%, 70% apical LAD, LCx luminal, RCA luminal, patent LIMA- LAD    In light of pts risk factors, CCS class III anginal symptoms and abnormal stress echo, pt now presents to [facility] for recommended cardiac catheterization with possible intervention if clinically indicated.  Cardiologist: Dr. Rivero  Pharmacy - [facility]  Escort - Daughter    68M, Uzbek/English speaking, former smoker, w/ FHx of CAD and PMHx of HTN, HLD, CAD s /p MIDCAB (3/2022), s/p subsequent PCI ([facility], 3/2022), mild carotid artery stenosis, BPH and HCV, initially presented to outpt cardiologist c/o substernal chest pressure w/ minimal exertion x 1 month. He states since PCI in 2022 his CP had resolved for a few months, and intermittent had chest pressure w/ prolonged exertion, however in the past month symptoms occur w/ 5 steps, similar to prior PCI. He denies any palpitations, dizziness, syncope, diaphoresis, fatigue, LE edema, GARCIA, orthopnea, PND, N/V, fever, chills or recent sick contact.     Stress Echo (1/2024 - per MD note): Moderate hypokinesis in the mid-basal inferior walls.   TTE (7/2023 - per MD note): LVEF normal G1DD and mild AR.  [facility] (3/14/22): [facility]ON RI (75%);  LM diffuse 30% stenosis, pLAD 75%, mLAD 75%, 70% apical LAD, LCx luminal, RCA luminal, patent LIMA- LAD    In light of pts risk factors, CCS class III anginal symptoms and abnormal stress echo, pt now presents to [facility] for recommended cardiac catheterization with possible intervention if clinically indicated.

## 2024-04-10 NOTE — H&P ADULT - NSICDXPASTMEDICALHX_GEN_ALL_CORE_FT
PAST MEDICAL HISTORY:  Hepatitis C     History of BPH     Hyperlipidemia     Hypertension     
PAST MEDICAL HISTORY:  Hepatitis C     History of BPH     Hyperlipidemia     Hypertension

## 2024-04-10 NOTE — H&P ADULT - ASSESSMENT
68M, former smoker, w/ FHx of CAD and PMHx of HTN, HLD, CAD s /p MIDCAB (3/2022), s/p subsequent PCI ([facility], 3/2022), mild carotid artery stenosis, BPH and HCV, now presents to [facility] for recommended cardiac cath w/ possible intervention if clinically indicated, in light of pts risk factors, CCS class __ anginal symptoms and abnormal ___.    - ASA 3, Mallampati 3  - Pt compliant w/ home ASA 81mg, last dose ____. Load w/ Plavix 600mg prior to cath.  - Pre cath IVF Hydration: NS 250cc bolus then c/w maintenance NS @ 75cc/hr x 2hrs  - Pre cath consented    Risks & benefits of procedure and alternative therapy have been explained to the patient including but not limited to: allergic reaction, bleeding w/possible need for blood transfusion, infection, renal and vascular compromise, limb damage, arrhythmia, stroke, vessel dissection/perforation, Myocardial infarction, emergent CABG. Informed consent obtained and in chart.  68M, former smoker, w/ FHx of CAD and PMHx of HTN, HLD, CAD s /p MIDCAB (3/2022), s/p subsequent PCI ([facility], 3/2022), mild carotid artery stenosis, BPH and HCV, now presents to [facility] for recommended cardiac cath w/ possible intervention if clinically indicated, in light of pts risk factors, CCS class III anginal symptoms and abnormal stress echo.    - ASA 3, Mallampati 3  - Pt compliant w/ home ASA 81mg, last dose 4/13. Load w/ Plavix 600mg and ASA 162mg prior to cath.  - Pre cath IVF Hydration: NS 250cc bolus then c/w maintenance NS @ 75cc/hr x 2hrs  - Pre cath consented    Risks & benefits of procedure and alternative therapy have been explained to the patient including but not limited to: allergic reaction, bleeding w/possible need for blood transfusion, infection, renal and vascular compromise, limb damage, arrhythmia, stroke, vessel dissection/perforation, Myocardial infarction, emergent CABG. Informed consent obtained and in chart.  68M, Icelandic/English speaking, former smoker, w/ FHx of CAD and PMHx of HTN, HLD, CAD s /p MIDCAB (3/2022), s/p subsequent PCI ([facility], 3/2022), mild carotid artery stenosis, BPH and HCV, now presents to [facility] for recommended cardiac cath w/ possible intervention if clinically indicated, in light of pts risk factors, CCS class III anginal symptoms and abnormal stress echo.    - ASA 3, Mallampati 3  - Pt compliant w/ home ASA 81mg, last dose 4/13. Load w/ Plavix 600mg and ASA 162mg prior to cath.  - Pre cath IVF Hydration: NS 250cc bolus then c/w maintenance NS @ 75cc/hr x 2hrs  - Pre cath consented    Risks & benefits of procedure and alternative therapy have been explained to the patient including but not limited to: allergic reaction, bleeding w/possible need for blood transfusion, infection, renal and vascular compromise, limb damage, arrhythmia, stroke, vessel dissection/perforation, Myocardial infarction, emergent CABG. Informed consent obtained and in chart.

## 2024-04-15 ENCOUNTER — TRANSCRIPTION ENCOUNTER (OUTPATIENT)
Age: 69
End: 2024-04-15

## 2024-04-15 ENCOUNTER — INPATIENT (INPATIENT)
Facility: HOSPITAL | Age: 69
LOS: 0 days | Discharge: HOME | DRG: 322 | End: 2024-04-16
Attending: INTERNAL MEDICINE | Admitting: INTERNAL MEDICINE
Payer: MEDICAID

## 2024-04-15 DIAGNOSIS — I77.89 OTHER SPECIFIED DISORDERS OF ARTERIES AND ARTERIOLES: ICD-10-CM

## 2024-04-15 DIAGNOSIS — Z79.82 LONG TERM (CURRENT) USE OF ASPIRIN: ICD-10-CM

## 2024-04-15 DIAGNOSIS — B19.20 UNSPECIFIED VIRAL HEPATITIS C WITHOUT HEPATIC COMA: ICD-10-CM

## 2024-04-15 DIAGNOSIS — T46.3X5A ADVERSE EFFECT OF CORONARY VASODILATORS, INITIAL ENCOUNTER: ICD-10-CM

## 2024-04-15 DIAGNOSIS — I25.84 CORONARY ATHEROSCLEROSIS DUE TO CALCIFIED CORONARY LESION: ICD-10-CM

## 2024-04-15 DIAGNOSIS — N40.0 BENIGN PROSTATIC HYPERPLASIA WITHOUT LOWER URINARY TRACT SYMPTOMS: ICD-10-CM

## 2024-04-15 DIAGNOSIS — I95.2 HYPOTENSION DUE TO DRUGS: ICD-10-CM

## 2024-04-15 DIAGNOSIS — I10 ESSENTIAL (PRIMARY) HYPERTENSION: ICD-10-CM

## 2024-04-15 DIAGNOSIS — I25.118 ATHEROSCLEROTIC HEART DISEASE OF NATIVE CORONARY ARTERY WITH OTHER FORMS OF ANGINA PECTORIS: ICD-10-CM

## 2024-04-15 DIAGNOSIS — E78.5 HYPERLIPIDEMIA, UNSPECIFIED: ICD-10-CM

## 2024-04-15 DIAGNOSIS — Z95.1 PRESENCE OF AORTOCORONARY BYPASS GRAFT: ICD-10-CM

## 2024-04-15 DIAGNOSIS — Z95.5 PRESENCE OF CORONARY ANGIOPLASTY IMPLANT AND GRAFT: ICD-10-CM

## 2024-04-15 DIAGNOSIS — Z82.49 FAMILY HISTORY OF ISCHEMIC HEART DISEASE AND OTHER DISEASES OF THE CIRCULATORY SYSTEM: ICD-10-CM

## 2024-04-15 DIAGNOSIS — Z87.891 PERSONAL HISTORY OF NICOTINE DEPENDENCE: ICD-10-CM

## 2024-04-15 LAB
A1C WITH ESTIMATED AVERAGE GLUCOSE RESULT: 5.5 % — SIGNIFICANT CHANGE UP (ref 4–5.6)
ALBUMIN SERPL ELPH-MCNC: 4.3 G/DL — SIGNIFICANT CHANGE UP (ref 3.3–5)
ALP SERPL-CCNC: 66 U/L — SIGNIFICANT CHANGE UP (ref 40–120)
ALT FLD-CCNC: 36 U/L — SIGNIFICANT CHANGE UP (ref 10–45)
ANION GAP SERPL CALC-SCNC: 12 MMOL/L — SIGNIFICANT CHANGE UP (ref 5–17)
APTT BLD: 30.7 SEC — SIGNIFICANT CHANGE UP (ref 24.5–35.6)
AST SERPL-CCNC: 28 U/L — SIGNIFICANT CHANGE UP (ref 10–40)
BASOPHILS # BLD AUTO: 0.1 K/UL — SIGNIFICANT CHANGE UP (ref 0–0.2)
BASOPHILS NFR BLD AUTO: 1.2 % — SIGNIFICANT CHANGE UP (ref 0–2)
BILIRUB DIRECT SERPL-MCNC: <0.2 MG/DL — SIGNIFICANT CHANGE UP (ref 0–0.3)
BILIRUB INDIRECT FLD-MCNC: SIGNIFICANT CHANGE UP (ref 0.2–1)
BILIRUB SERPL-MCNC: 0.9 MG/DL — SIGNIFICANT CHANGE UP (ref 0.2–1.2)
BUN SERPL-MCNC: 15 MG/DL — SIGNIFICANT CHANGE UP (ref 7–23)
CALCIUM SERPL-MCNC: 9.9 MG/DL — SIGNIFICANT CHANGE UP (ref 8.4–10.5)
CHLORIDE SERPL-SCNC: 106 MMOL/L — SIGNIFICANT CHANGE UP (ref 96–108)
CHOLEST SERPL-MCNC: 89 MG/DL — SIGNIFICANT CHANGE UP
CK MB CFR SERPL CALC: 1.4 NG/ML — SIGNIFICANT CHANGE UP (ref 0–6.7)
CK SERPL-CCNC: 90 U/L — SIGNIFICANT CHANGE UP (ref 30–200)
CO2 SERPL-SCNC: 24 MMOL/L — SIGNIFICANT CHANGE UP (ref 22–31)
CREAT SERPL-MCNC: 0.9 MG/DL — SIGNIFICANT CHANGE UP (ref 0.5–1.3)
EGFR: 93 ML/MIN/1.73M2 — SIGNIFICANT CHANGE UP
EOSINOPHIL # BLD AUTO: 0.22 K/UL — SIGNIFICANT CHANGE UP (ref 0–0.5)
EOSINOPHIL NFR BLD AUTO: 2.5 % — SIGNIFICANT CHANGE UP (ref 0–6)
ESTIMATED AVERAGE GLUCOSE: 111 MG/DL — SIGNIFICANT CHANGE UP (ref 68–114)
GLUCOSE SERPL-MCNC: 104 MG/DL — HIGH (ref 70–99)
HCT VFR BLD CALC: 42.1 % — SIGNIFICANT CHANGE UP (ref 39–50)
HDLC SERPL-MCNC: 36 MG/DL — LOW
HGB BLD-MCNC: 14.8 G/DL — SIGNIFICANT CHANGE UP (ref 13–17)
IMM GRANULOCYTES NFR BLD AUTO: 0.2 % — SIGNIFICANT CHANGE UP (ref 0–0.9)
INR BLD: 0.93 — SIGNIFICANT CHANGE UP (ref 0.85–1.18)
ISTAT ACTK (ACTIVATED CLOTTING TIME KAOLIN): 244 SEC — HIGH (ref 74–137)
ISTAT ACTK (ACTIVATED CLOTTING TIME KAOLIN): 266 SEC — HIGH (ref 74–137)
LIPID PNL WITH DIRECT LDL SERPL: 6 MG/DL — SIGNIFICANT CHANGE UP
LYMPHOCYTES # BLD AUTO: 3.92 K/UL — HIGH (ref 1–3.3)
LYMPHOCYTES # BLD AUTO: 45.4 % — HIGH (ref 13–44)
MAGNESIUM SERPL-MCNC: 2.1 MG/DL — SIGNIFICANT CHANGE UP (ref 1.6–2.6)
MCHC RBC-ENTMCNC: 31.7 PG — SIGNIFICANT CHANGE UP (ref 27–34)
MCHC RBC-ENTMCNC: 35.2 GM/DL — SIGNIFICANT CHANGE UP (ref 32–36)
MCV RBC AUTO: 90.1 FL — SIGNIFICANT CHANGE UP (ref 80–100)
MONOCYTES # BLD AUTO: 0.74 K/UL — SIGNIFICANT CHANGE UP (ref 0–0.9)
MONOCYTES NFR BLD AUTO: 8.6 % — SIGNIFICANT CHANGE UP (ref 2–14)
NEUTROPHILS # BLD AUTO: 3.63 K/UL — SIGNIFICANT CHANGE UP (ref 1.8–7.4)
NEUTROPHILS NFR BLD AUTO: 42.1 % — LOW (ref 43–77)
NON HDL CHOLESTEROL: 53 MG/DL — SIGNIFICANT CHANGE UP
NRBC # BLD: 0 /100 WBCS — SIGNIFICANT CHANGE UP (ref 0–0)
PLATELET # BLD AUTO: 235 K/UL — SIGNIFICANT CHANGE UP (ref 150–400)
POTASSIUM SERPL-MCNC: 3.8 MMOL/L — SIGNIFICANT CHANGE UP (ref 3.5–5.3)
POTASSIUM SERPL-SCNC: 3.8 MMOL/L — SIGNIFICANT CHANGE UP (ref 3.5–5.3)
PROT SERPL-MCNC: 6.9 G/DL — SIGNIFICANT CHANGE UP (ref 6–8.3)
PROTHROM AB SERPL-ACNC: 10.6 SEC — SIGNIFICANT CHANGE UP (ref 9.5–13)
RBC # BLD: 4.67 M/UL — SIGNIFICANT CHANGE UP (ref 4.2–5.8)
RBC # FLD: 12.9 % — SIGNIFICANT CHANGE UP (ref 10.3–14.5)
SODIUM SERPL-SCNC: 142 MMOL/L — SIGNIFICANT CHANGE UP (ref 135–145)
TRIGL SERPL-MCNC: 345 MG/DL — HIGH
WBC # BLD: 8.63 K/UL — SIGNIFICANT CHANGE UP (ref 3.8–10.5)
WBC # FLD AUTO: 8.63 K/UL — SIGNIFICANT CHANGE UP (ref 3.8–10.5)

## 2024-04-15 PROCEDURE — 93010 ELECTROCARDIOGRAM REPORT: CPT

## 2024-04-15 RX ORDER — DOCUSATE SODIUM 100 MG
1 CAPSULE ORAL
Refills: 0 | DISCHARGE

## 2024-04-15 RX ORDER — ASPIRIN/CALCIUM CARB/MAGNESIUM 324 MG
81 TABLET ORAL DAILY
Refills: 0 | Status: DISCONTINUED | OUTPATIENT
Start: 2024-04-16 | End: 2024-04-16

## 2024-04-15 RX ORDER — SODIUM CHLORIDE 9 MG/ML
1000 INJECTION INTRAMUSCULAR; INTRAVENOUS; SUBCUTANEOUS
Refills: 0 | Status: DISCONTINUED | OUTPATIENT
Start: 2024-04-15 | End: 2024-04-15

## 2024-04-15 RX ORDER — ASPIRIN/CALCIUM CARB/MAGNESIUM 324 MG
162 TABLET ORAL ONCE
Refills: 0 | Status: COMPLETED | OUTPATIENT
Start: 2024-04-15 | End: 2024-04-15

## 2024-04-15 RX ORDER — TAMSULOSIN HYDROCHLORIDE 0.4 MG/1
1 CAPSULE ORAL
Refills: 0 | DISCHARGE

## 2024-04-15 RX ORDER — METOPROLOL TARTRATE 50 MG
1 TABLET ORAL
Refills: 0 | DISCHARGE

## 2024-04-15 RX ORDER — SODIUM CHLORIDE 9 MG/ML
250 INJECTION INTRAMUSCULAR; INTRAVENOUS; SUBCUTANEOUS ONCE
Refills: 0 | Status: COMPLETED | OUTPATIENT
Start: 2024-04-15 | End: 2024-04-15

## 2024-04-15 RX ORDER — FOLIC ACID 0.8 MG
1 TABLET ORAL DAILY
Refills: 0 | Status: DISCONTINUED | OUTPATIENT
Start: 2024-04-15 | End: 2024-04-16

## 2024-04-15 RX ORDER — EVOLOCUMAB 140 MG/ML
420 INJECTION, SOLUTION SUBCUTANEOUS
Refills: 0 | DISCHARGE

## 2024-04-15 RX ORDER — METOPROLOL TARTRATE 50 MG
25 TABLET ORAL DAILY
Refills: 0 | Status: DISCONTINUED | OUTPATIENT
Start: 2024-04-15 | End: 2024-04-16

## 2024-04-15 RX ORDER — ASPIRIN/CALCIUM CARB/MAGNESIUM 324 MG
81 TABLET ORAL DAILY
Refills: 0 | Status: DISCONTINUED | OUTPATIENT
Start: 2024-04-15 | End: 2024-04-15

## 2024-04-15 RX ORDER — ISOSORBIDE MONONITRATE 60 MG/1
30 TABLET, EXTENDED RELEASE ORAL DAILY
Refills: 0 | Status: DISCONTINUED | OUTPATIENT
Start: 2024-04-15 | End: 2024-04-16

## 2024-04-15 RX ORDER — ROSUVASTATIN CALCIUM 5 MG/1
1 TABLET ORAL
Refills: 0 | DISCHARGE

## 2024-04-15 RX ORDER — CLOPIDOGREL BISULFATE 75 MG/1
75 TABLET, FILM COATED ORAL DAILY
Refills: 0 | Status: DISCONTINUED | OUTPATIENT
Start: 2024-04-16 | End: 2024-04-16

## 2024-04-15 RX ORDER — MORPHINE SULFATE 50 MG/1
1 CAPSULE, EXTENDED RELEASE ORAL ONCE
Refills: 0 | Status: DISCONTINUED | OUTPATIENT
Start: 2024-04-15 | End: 2024-04-15

## 2024-04-15 RX ORDER — FENOFIBRATE,MICRONIZED 130 MG
1 CAPSULE ORAL
Refills: 0 | DISCHARGE

## 2024-04-15 RX ORDER — SENNA PLUS 8.6 MG/1
1 TABLET ORAL
Refills: 0 | DISCHARGE

## 2024-04-15 RX ORDER — TAMSULOSIN HYDROCHLORIDE 0.4 MG/1
0.4 CAPSULE ORAL AT BEDTIME
Refills: 0 | Status: DISCONTINUED | OUTPATIENT
Start: 2024-04-15 | End: 2024-04-16

## 2024-04-15 RX ORDER — SODIUM CHLORIDE 9 MG/ML
1000 INJECTION INTRAMUSCULAR; INTRAVENOUS; SUBCUTANEOUS
Refills: 0 | Status: DISCONTINUED | OUTPATIENT
Start: 2024-04-15 | End: 2024-04-16

## 2024-04-15 RX ORDER — PANTOPRAZOLE SODIUM 20 MG/1
40 TABLET, DELAYED RELEASE ORAL
Refills: 0 | Status: DISCONTINUED | OUTPATIENT
Start: 2024-04-15 | End: 2024-04-16

## 2024-04-15 RX ORDER — CLOPIDOGREL BISULFATE 75 MG/1
600 TABLET, FILM COATED ORAL ONCE
Refills: 0 | Status: COMPLETED | OUTPATIENT
Start: 2024-04-15 | End: 2024-04-15

## 2024-04-15 RX ORDER — ATORVASTATIN CALCIUM 80 MG/1
80 TABLET, FILM COATED ORAL AT BEDTIME
Refills: 0 | Status: DISCONTINUED | OUTPATIENT
Start: 2024-04-15 | End: 2024-04-16

## 2024-04-15 RX ADMIN — Medication 25 MILLIGRAM(S): at 22:41

## 2024-04-15 RX ADMIN — ISOSORBIDE MONONITRATE 30 MILLIGRAM(S): 60 TABLET, EXTENDED RELEASE ORAL at 22:40

## 2024-04-15 RX ADMIN — PANTOPRAZOLE SODIUM 40 MILLIGRAM(S): 20 TABLET, DELAYED RELEASE ORAL at 22:41

## 2024-04-15 RX ADMIN — CLOPIDOGREL BISULFATE 600 MILLIGRAM(S): 75 TABLET, FILM COATED ORAL at 15:21

## 2024-04-15 RX ADMIN — MORPHINE SULFATE 1 MILLIGRAM(S): 50 CAPSULE, EXTENDED RELEASE ORAL at 18:35

## 2024-04-15 RX ADMIN — SODIUM CHLORIDE 250 MILLILITER(S): 9 INJECTION INTRAMUSCULAR; INTRAVENOUS; SUBCUTANEOUS at 14:44

## 2024-04-15 RX ADMIN — ATORVASTATIN CALCIUM 80 MILLIGRAM(S): 80 TABLET, FILM COATED ORAL at 22:41

## 2024-04-15 RX ADMIN — SODIUM CHLORIDE 200 MILLILITER(S): 9 INJECTION INTRAMUSCULAR; INTRAVENOUS; SUBCUTANEOUS at 18:15

## 2024-04-15 RX ADMIN — MORPHINE SULFATE 1 MILLIGRAM(S): 50 CAPSULE, EXTENDED RELEASE ORAL at 18:21

## 2024-04-15 RX ADMIN — SODIUM CHLORIDE 75 MILLILITER(S): 9 INJECTION INTRAMUSCULAR; INTRAVENOUS; SUBCUTANEOUS at 14:47

## 2024-04-15 RX ADMIN — Medication 162 MILLIGRAM(S): at 15:21

## 2024-04-15 RX ADMIN — TAMSULOSIN HYDROCHLORIDE 0.4 MILLIGRAM(S): 0.4 CAPSULE ORAL at 22:41

## 2024-04-15 RX ADMIN — Medication 1 MILLIGRAM(S): at 22:41

## 2024-04-15 NOTE — DISCHARGE NOTE PROVIDER - CARE PROVIDER_API CALL
Pravin Rivero  Cardiovascular Disease  [address] [place] [address]  Phone: [phone]  Fax: [phone]  Established Patient  Follow Up Time: 1 week

## 2024-04-15 NOTE — DISCHARGE NOTE PROVIDER - NSDCCPCAREPLAN_GEN_ALL_CORE_FT
PRINCIPAL DISCHARGE DIAGNOSIS  Diagnosis: CAD (coronary artery disease)  Assessment and Plan of Treatment: You were found to have blockages in the arteries of your heart, also known as Coronary Artery Disease. You underwent a cardiac catheterization on 4/15/24 and received a stent to the right coronary artery.  PLEASE CONTINUE ASPIRIN 81MG DAILY AND PLAVIX 75MG DAILY. DO NOT STOP THESE MEDICATIONS FOR ANY REASON AS THEY ARE KEEPING YOUR STENT OPEN AND PREVENTING A HEART ATTACK.   Avoid strenuous activity or heavy lifting anything more than 5lbs for the next five days. Do not take a bath or swim for the next five days; you may shower. For any bleeding or hematoma formation (hardened blood collection under the skin) at the access site of your left wrist please hold pressure and go to the emergency room. Please follow up with Dr. Rivero in 1-2 weeks. For recurrent chest pain, please call your doctor or go to the emergency room.      SECONDARY DISCHARGE DIAGNOSES  Diagnosis: HTN (hypertension)  Assessment and Plan of Treatment: Please continue ____ as listed to keep your blood pressure controlled. For blood pressure that is too high or too low please see your doctor or go to the emergency room as necessary.    Diagnosis: HLD (hyperlipidemia)  Assessment and Plan of Treatment: Please continue ____ at bedtime to keep your cholesterol low. High cholesterol contributes to heart disease.     PRINCIPAL DISCHARGE DIAGNOSIS  Diagnosis: CAD (coronary artery disease)  Assessment and Plan of Treatment: You were found to have blockages in the arteries of your heart, also known as Coronary Artery Disease. You underwent a cardiac catheterization on 4/15/24 and received a stent to the right coronary artery.  PLEASE CONTINUE ASPIRIN 81MG DAILY AND PLAVIX 75MG DAILY. DO NOT STOP THESE MEDICATIONS FOR ANY REASON AS THEY ARE KEEPING YOUR STENT OPEN AND PREVENTING A HEART ATTACK.   Avoid strenuous activity or heavy lifting anything more than 5lbs for the next five days. Do not take a bath or swim for the next five days; you may shower. For any bleeding or hematoma formation (hardened blood collection under the skin) at the access site of your left wrist please hold pressure and go to the emergency room. Please follow up with Dr. Rivero in 1-2 weeks. For recurrent chest pain, please call your doctor or go to the emergency room.      SECONDARY DISCHARGE DIAGNOSES  Diagnosis: HTN (hypertension)  Assessment and Plan of Treatment: Please continue Metoprolol XL 25mg daily as listed to keep your blood pressure controlled. For blood pressure that is too high or too low please see your doctor or go to the emergency room as necessary.    Diagnosis: HLD (hyperlipidemia)  Assessment and Plan of Treatment: Please continue rosuvastatin 20mg at bedtime, repatha injection month and fenofibrate 160mg daily to keep your cholesterol low. High cholesterol contributes to heart disease.

## 2024-04-15 NOTE — PATIENT PROFILE ADULT - FALL HARM RISK - HARM RISK INTERVENTIONS
Assistance with ambulation/Assistance OOB with selected safe patient handling equipment/Communicate Risk of Fall with Harm to all staff/Discuss with provider need for PT consult/Monitor gait and stability/Provide patient with walking aids - walker, cane, crutches/Reinforce activity limits and safety measures with patient and family/Sit up slowly, dangle for a short time, stand at bedside before walking/Tailored Fall Risk Interventions/Use of alarms - bed, chair and/or voice tab/Visual Cue: Yellow wristband and red socks/Bed in lowest position, wheels locked, appropriate side rails in place/Call bell, personal items and telephone in reach/Instruct patient to call for assistance before getting out of bed or chair/Non-slip footwear when patient is out of bed/[place] to call system/Physically safe environment - no spills, clutter or unnecessary equipment/Purposeful Proactive Rounding/Room/bathroom lighting operational, light cord in reach

## 2024-04-15 NOTE — DISCHARGE NOTE PROVIDER - NSDCMRMEDTOKEN_GEN_ALL_CORE_FT
Aspirin EC 81 mg oral delayed release tablet: 1 tab(s) orally once a day  docusate sodium 100 mg oral capsule: 1 cap(s) orally once a day  fenofibrate 160 mg oral tablet: 1 tab(s) orally once a day  folic acid 1 mg oral tablet: 1 tab(s) orally once a day  metoprolol succinate 25 mg oral capsule, extended release: 1 cap(s) orally once a day  naproxen 375 mg oral tablet: 1 tab(s) orally once a day  pantoprazole 20 mg oral delayed release tablet: 1 tab(s) orally once a day  Repatha Pushtronex 420 mg/3.5 mL subcutaneous solution: 420 milligram(s) subcutaneously once a month  rosuvastatin 20 mg oral tablet: 1 tab(s) orally once a day (at bedtime)  tamsulosin 0.4 mg oral capsule: 1 cap(s) orally once a day   Aspirin EC 81 mg oral delayed release tablet: 1 tab(s) orally once a day  Cardiac Rehab: 2-3x/week for 12 weeks. Dx - CAD s/p PCI. Cardiologist - Dr. Rivero  clopidogrel 75 mg oral tablet: 1 tab(s) orally once a day  docusate sodium 100 mg oral capsule: 1 cap(s) orally once a day  fenofibrate 160 mg oral tablet: 1 tab(s) orally once a day  folic acid 1 mg oral tablet: 1 tab(s) orally once a day  metoprolol succinate 25 mg oral capsule, extended release: 1 cap(s) orally once a day  pantoprazole 20 mg oral delayed release tablet: 1 tab(s) orally once a day  Repatha Pushtronex 420 mg/3.5 mL subcutaneous solution: 420 milligram(s) subcutaneously once a month  rosuvastatin 20 mg oral tablet: 1 tab(s) orally once a day (at bedtime)  tamsulosin 0.4 mg oral capsule: 1 cap(s) orally once a day

## 2024-04-15 NOTE — DISCHARGE NOTE PROVIDER - HOSPITAL COURSE
68M, [place]/English speaking, former smoker, w/ FHx of CAD and PMHx of HTN, HLD, CAD s /p MIDCAB (3/2022), s/p subsequent PCI (BRANDON RI, 3/2022), mild carotid artery stenosis, BPH and HCV, now presents to [facility] for recommended cardiac cath w/ possible intervention if clinically indicated, in light of pts risk factors, CCS class III anginal symptoms and abnormal stress echo. Pt now s/p cardiac cath 4/15/24: BRANDON RCA; LIMA-LAD patent, RI patent stent, LCx 30%, EDP 17, access L radial artery. Pt admitted overnight for observation and telemetry monitoring. Pt seen and examined at bedside this AM without any complaints or events overnight, VSS, labs and telemetry reviewed and pt stable for discharge as discussed with Dr. Griffin. Pt has received appropriate discharge instructions, including medication regimen, access site management and follow up with Dr. Rivero in 1-2 weeks.    Discharge medications: ASA 81mg QD, Plavix 75mg QD, ______________.    Cardiac Rehab (Post PCI): Education on benefits of Cardiac Rehab provided to patient: Yes, Referral and Prescription Given for Cardiac Rehab: Yes, Pt given list of locations & instructed to contact their insurance company to review list of participating providers.    Pt discharge copies detail cardiovascular history, medication testing/treatments OR has created a patient portal account and instructed to provider their records at their 1st appointment.    CVD (GLP-1 receptor agonist, SGLT2 inhibitor) meds discussed w/ patients and encouraged to discuss further with PMD or endo at next visit. 68M, [place]zeri/English speaking, former smoker, w/ FHx of CAD and PMHx of HTN, HLD, CAD s /p MIDCAB (3/2022), s/p subsequent PCI (BRANDON RI, 3/2022), mild carotid artery stenosis, BPH and HCV, now presents to [facility] for recommended cardiac cath w/ possible intervention if clinically indicated, in light of pts risk factors, CCS class III anginal symptoms and abnormal stress echo. Pt now s/p cardiac cath 4/15/24: BRANDON dRCA (90-95%); LIMA-LAD patent, RI stent patent, LCx diffuse 20-30%, dLM 30%, EDP 17, access L radial artery. Post cath pt endorsing 8/10 CP, EKG revealed new TWI in V6, otherwise non ischemic and pt evaluated by fellow at bedside. Pain improved w/ Morphine and pt started on Imdur, however found to be hypotensive and Imdur discontinued.     Pt seen and examined at bedside this AM without any complaints or events overnight, VSS, labs and telemetry reviewed and pt stable for discharge as discussed with Dr. Mcmullen. Pt has received appropriate discharge instructions, including medication regimen, access site management and follow up with Dr. Rivero in 1-2 weeks.    Discharge medications: ASA 81mg QD, Plavix 75mg QD, Crestor 20mg HS, Repatha monthly, Fenofibrate 160mg QD, Toprol 25mg QD, Pantoprazole 40mg QD.    Cardiac Rehab (Post PCI): Education on benefits of Cardiac Rehab provided to patient: Yes, Referral and Prescription Given for Cardiac Rehab: Yes, Pt given list of locations & instructed to contact their insurance company to review list of participating providers.    Pt discharge copies detail cardiovascular history, medication testing/treatments OR has created a patient portal account and instructed to provider their records at their 1st appointment.    CVD (GLP-1 receptor agonist, SGLT2 inhibitor) meds discussed w/ patients and encouraged to discuss further with PMD or endo at next visit. 68M, Green[place]ic/English speaking, former smoker, w/ FHx of CAD and PMHx of HTN, HLD, CAD s /p MIDCAB (3/2022), s/p subsequent PCI (BRANDON RI, 3/2022), mild carotid artery stenosis, BPH and HCV, now presents to [facility] for recommended cardiac cath w/ possible intervention if clinically indicated, in light of pts risk factors, CCS class III anginal symptoms and abnormal stress echo. Pt now s/p cardiac cath 4/15/24: BRANDON dRCA (90-95%); LIMA-LAD patent, RI stent patent, LCx diffuse 20-30%, dLM 30%, EDP 17, access L radial artery. Post cath pt endorsing 8/10 CP, EKG revealed new TWI in V6, otherwise non ischemic and pt evaluated by fellow at bedside. Pain improved w/ Morphine and pt started on Imdur, however found to be hypotensive and Imdur discontinued.     Pt seen and examined at bedside this AM without any complaints or events overnight, VSS, labs and telemetry reviewed and pt stable for discharge as discussed with Dr. Mcmullen. Pt has received appropriate discharge instructions, including medication regimen, access site management and follow up with Dr. Rivero in 1-2 weeks.    Discharge medications: ASA 81mg QD, Plavix 75mg QD, Crestor 20mg HS, Repatha monthly, Fenofibrate 160mg QD, Toprol 25mg QD, Pantoprazole 40mg QD.    Cardiac Rehab (Post PCI): Education on benefits of Cardiac Rehab provided to patient: Yes, Referral and Prescription Given for Cardiac Rehab: Yes, Pt given list of locations & instructed to contact their insurance company to review list of participating providers.    Pt discharge copies detail cardiovascular history, medication testing/treatments OR has created a patient portal account and instructed to provider their records at their 1st appointment.    CVD (GLP-1 receptor agonist, SGLT2 inhibitor) meds discussed w/ patients and encouraged to discuss further with PMD or endo at next visit.   .

## 2024-04-16 ENCOUNTER — TRANSCRIPTION ENCOUNTER (OUTPATIENT)
Age: 69
End: 2024-04-16

## 2024-04-16 VITALS
HEART RATE: 67 BPM | SYSTOLIC BLOOD PRESSURE: 98 MMHG | DIASTOLIC BLOOD PRESSURE: 52 MMHG | OXYGEN SATURATION: 97 % | RESPIRATION RATE: 18 BRPM

## 2024-04-16 LAB
ANION GAP SERPL CALC-SCNC: 8 MMOL/L — SIGNIFICANT CHANGE UP (ref 5–17)
BUN SERPL-MCNC: 20 MG/DL — SIGNIFICANT CHANGE UP (ref 7–23)
CALCIUM SERPL-MCNC: 9 MG/DL — SIGNIFICANT CHANGE UP (ref 8.4–10.5)
CHLORIDE SERPL-SCNC: 107 MMOL/L — SIGNIFICANT CHANGE UP (ref 96–108)
CO2 SERPL-SCNC: 28 MMOL/L — SIGNIFICANT CHANGE UP (ref 22–31)
CREAT SERPL-MCNC: 0.99 MG/DL — SIGNIFICANT CHANGE UP (ref 0.5–1.3)
EGFR: 83 ML/MIN/1.73M2 — SIGNIFICANT CHANGE UP
GLUCOSE SERPL-MCNC: 117 MG/DL — HIGH (ref 70–99)
HCT VFR BLD CALC: 38.6 % — LOW (ref 39–50)
HGB BLD-MCNC: 13.3 G/DL — SIGNIFICANT CHANGE UP (ref 13–17)
MAGNESIUM SERPL-MCNC: 1.9 MG/DL — SIGNIFICANT CHANGE UP (ref 1.6–2.6)
MCHC RBC-ENTMCNC: 31.6 PG — SIGNIFICANT CHANGE UP (ref 27–34)
MCHC RBC-ENTMCNC: 34.5 GM/DL — SIGNIFICANT CHANGE UP (ref 32–36)
MCV RBC AUTO: 91.7 FL — SIGNIFICANT CHANGE UP (ref 80–100)
NRBC # BLD: 0 /100 WBCS — SIGNIFICANT CHANGE UP (ref 0–0)
PLATELET # BLD AUTO: 219 K/UL — SIGNIFICANT CHANGE UP (ref 150–400)
POTASSIUM SERPL-MCNC: 4 MMOL/L — SIGNIFICANT CHANGE UP (ref 3.5–5.3)
POTASSIUM SERPL-SCNC: 4 MMOL/L — SIGNIFICANT CHANGE UP (ref 3.5–5.3)
RBC # BLD: 4.21 M/UL — SIGNIFICANT CHANGE UP (ref 4.2–5.8)
RBC # FLD: 13.2 % — SIGNIFICANT CHANGE UP (ref 10.3–14.5)
SODIUM SERPL-SCNC: 143 MMOL/L — SIGNIFICANT CHANGE UP (ref 135–145)
WBC # BLD: 7.63 K/UL — SIGNIFICANT CHANGE UP (ref 3.8–10.5)
WBC # FLD AUTO: 7.63 K/UL — SIGNIFICANT CHANGE UP (ref 3.8–10.5)

## 2024-04-16 PROCEDURE — 36415 COLL VENOUS BLD VENIPUNCTURE: CPT

## 2024-04-16 PROCEDURE — C1753: CPT

## 2024-04-16 PROCEDURE — 93005 ELECTROCARDIOGRAM TRACING: CPT

## 2024-04-16 PROCEDURE — 82553 CREATINE MB FRACTION: CPT

## 2024-04-16 PROCEDURE — C1887: CPT

## 2024-04-16 PROCEDURE — 82248 BILIRUBIN DIRECT: CPT

## 2024-04-16 PROCEDURE — 85730 THROMBOPLASTIN TIME PARTIAL: CPT

## 2024-04-16 PROCEDURE — 80053 COMPREHEN METABOLIC PANEL: CPT

## 2024-04-16 PROCEDURE — 83735 ASSAY OF MAGNESIUM: CPT

## 2024-04-16 PROCEDURE — 99239 HOSP IP/OBS DSCHRG MGMT >30: CPT

## 2024-04-16 PROCEDURE — 80061 LIPID PANEL: CPT

## 2024-04-16 PROCEDURE — 82550 ASSAY OF CK (CPK): CPT

## 2024-04-16 PROCEDURE — C1725: CPT

## 2024-04-16 PROCEDURE — 85610 PROTHROMBIN TIME: CPT

## 2024-04-16 PROCEDURE — 85347 COAGULATION TIME ACTIVATED: CPT

## 2024-04-16 PROCEDURE — 83036 HEMOGLOBIN GLYCOSYLATED A1C: CPT

## 2024-04-16 PROCEDURE — C1769: CPT

## 2024-04-16 PROCEDURE — 80048 BASIC METABOLIC PNL TOTAL CA: CPT

## 2024-04-16 PROCEDURE — 85027 COMPLETE CBC AUTOMATED: CPT

## 2024-04-16 PROCEDURE — 85025 COMPLETE CBC W/AUTO DIFF WBC: CPT

## 2024-04-16 PROCEDURE — C1894: CPT

## 2024-04-16 PROCEDURE — C1874: CPT

## 2024-04-16 RX ORDER — CLOPIDOGREL BISULFATE 75 MG/1
1 TABLET, FILM COATED ORAL
Qty: 30 | Refills: 11
Start: 2024-04-16 | End: 2025-04-10

## 2024-04-16 RX ORDER — SODIUM CHLORIDE 9 MG/ML
250 INJECTION INTRAMUSCULAR; INTRAVENOUS; SUBCUTANEOUS ONCE
Refills: 0 | Status: COMPLETED | OUTPATIENT
Start: 2024-04-16 | End: 2024-04-16

## 2024-04-16 RX ORDER — ASPIRIN/CALCIUM CARB/MAGNESIUM 324 MG
1 TABLET ORAL
Qty: 30 | Refills: 11
Start: 2024-04-16 | End: 2025-04-10

## 2024-04-16 RX ORDER — ASPIRIN/CALCIUM CARB/MAGNESIUM 324 MG
1 TABLET ORAL
Refills: 0 | DISCHARGE

## 2024-04-16 RX ORDER — ACETAMINOPHEN 500 MG
650 TABLET ORAL ONCE
Refills: 0 | Status: COMPLETED | OUTPATIENT
Start: 2024-04-16 | End: 2024-04-16

## 2024-04-16 RX ADMIN — Medication 650 MILLIGRAM(S): at 10:01

## 2024-04-16 RX ADMIN — Medication 1 MILLIGRAM(S): at 12:05

## 2024-04-16 RX ADMIN — Medication 81 MILLIGRAM(S): at 12:05

## 2024-04-16 RX ADMIN — PANTOPRAZOLE SODIUM 40 MILLIGRAM(S): 20 TABLET, DELAYED RELEASE ORAL at 06:50

## 2024-04-16 RX ADMIN — CLOPIDOGREL BISULFATE 75 MILLIGRAM(S): 75 TABLET, FILM COATED ORAL at 12:05

## 2024-04-16 RX ADMIN — SODIUM CHLORIDE 1000 MILLILITER(S): 9 INJECTION INTRAMUSCULAR; INTRAVENOUS; SUBCUTANEOUS at 10:32

## 2024-04-16 NOTE — PROVIDER CONTACT NOTE (OTHER) - BACKGROUND
Pt arrived to [facility] from cath lab s/p PCI. In cath lab, pt had 8/10 CP and EKG changes, given IV morphine and CP reduced to 4/10 while in cath lab holding, cleared for transport to [facility].
s/p cardiac cath 4/15

## 2024-04-16 NOTE — PROVIDER CONTACT NOTE (OTHER) - ASSESSMENT
Pt resting comfortably in bed. No c/o chest pain, dizziness or heart palpitations. Pt asymptomatic.
On arrival, pt ambulated to bed, now c/o 6/10 CP, remains at 6/10 after settling in bed. Vital Signs stable, pt c/o mild difficulty breathing.

## 2024-04-16 NOTE — PROVIDER CONTACT NOTE (OTHER) - ACTION/TREATMENT ORDERED:
12-lead ECG ordered and completed, HELEN Butts reviewed ECG, PA to follow up.
HELEN Porras advised pt was recently given Bp medications that may have caused him to have hypotension. Hold Lopressor medication and continue to monitor.

## 2024-04-16 NOTE — DISCHARGE NOTE NURSING/CASE MANAGEMENT/SOCIAL WORK - PATIENT PORTAL LINK FT
You can access the FollowMyHealth Patient Portal offered by [facility] by registering at the following website: [email or web address]. By joining [employer]’s FollowMyHealth portal, you will also be able to view your health information using other applications (apps) compatible with our system.

## (undated) DEVICE — DRSG BIOPATCH DISK W CHG 1" W 4.0MM HOLE

## (undated) DEVICE — SUT PROLENE 8-0 24" BV175-6

## (undated) DEVICE — CATH NG SALEM SUMP 16FR

## (undated) DEVICE — SUT MONOCRYL 4-0 27" PS-2 UNDYED

## (undated) DEVICE — DRAPE FLUID WARMER 44 X 66"

## (undated) DEVICE — DRSG ALLEVYN LIFE 6 X 6 (PINK)

## (undated) DEVICE — CATH IV SAFE BC 24G X 0.75" (YELLOW)

## (undated) DEVICE — Device

## (undated) DEVICE — SUT PROLENE 7-0 24" BV175-6

## (undated) DEVICE — DRAIN RESERVOIR FOR JACKSON PRATT 100CC CARDINAL

## (undated) DEVICE — ELCTR STRYKER NEPTUNE SMOKE EVACUATION PENCIL (GREEN)

## (undated) DEVICE — SYR LUER LOK 30CC

## (undated) DEVICE — CHEST DRAIN OASIS DRY SUCTION WATER SEAL

## (undated) DEVICE — ELCTR STRYKER EXTENSION SUCTION TIP 125MM

## (undated) DEVICE — ELCTR BOVIE TIP BLADE INSULATED 6.5" EDGE

## (undated) DEVICE — DRSG DERMABOND 0.7ML

## (undated) DEVICE — SUT NUROLON 1 18" OS-8 (POP-OFF)

## (undated) DEVICE — SUT VICRYL 0 27" CT

## (undated) DEVICE — PACK OPEN HEART LNX

## (undated) DEVICE — SUT DOUBLE 6 WIRE STERNAL

## (undated) DEVICE — SUT STAINLESS STEEL 6 4-18" CCS

## (undated) DEVICE — DRAPE PROBE COVER 5" X 96"

## (undated) DEVICE — XI DRAPE COLUMN

## (undated) DEVICE — TISSUE STABILIZER MEDTRONIC OCTOPUS EVOLUTION

## (undated) DEVICE — D HELP - CLEARVIEW CLEARIFY SYSTEM

## (undated) DEVICE — CATH EPI  3 EYE CLOSED END 20G

## (undated) DEVICE — DRAPE IOBAN 33" X 23"

## (undated) DEVICE — SUT ETHIBOND 0 18" TIES

## (undated) DEVICE — XI SEAL UNIV 5- 8 MM

## (undated) DEVICE — LOOP VASC SILICONE ELASTOMER W NDL 18G

## (undated) DEVICE — ELCTR BOVIE TIP NEEDLE INSULATED 6.5" EDGE

## (undated) DEVICE — CATH CV TRAY INSR ST UNIV

## (undated) DEVICE — DVC ASCOPE 4 SNGL USE SLIM

## (undated) DEVICE — POSITIONER FOAM EGG CRATE ULNAR 2PCS (PINK)

## (undated) DEVICE — SUT VICRYL 2-0 27" CT-1

## (undated) DEVICE — DRSG TRACH DRAINAGE 4X4

## (undated) DEVICE — FOLEY TRAY 16FR 5CC LF LUBRISIL ADVANCE TEMP CLOSED

## (undated) DEVICE — NDL HYPO SAFE 22G X 1.5" (BLACK)

## (undated) DEVICE — DRSG RESTRAINT LIMB WRAP AROUND

## (undated) DEVICE — STABILIZER W STABLESOFT II LS

## (undated) DEVICE — TUBING SMOKE EVAC 3/8" X 10FT FOR NEPTUNE

## (undated) DEVICE — DRSG MEPILEX 10 X 25CM (4 X 10") AG

## (undated) DEVICE — STABILIZR OCTOPUS NUVO

## (undated) DEVICE — SUMP INTRACARDIAC/PERICARDIAL 20FR 1/4" ADULT

## (undated) DEVICE — SUCTION CATH AIRLIFE CONTROL VALVE TRIFLO 14FR

## (undated) DEVICE — GOWN XXL

## (undated) DEVICE — SUCTION CATH ARGYLE WHISTLE TIP 14FR STRAIGHT PACKED

## (undated) DEVICE — SYNOVIS VASCULAR PROBE 1.5MM 15CM

## (undated) DEVICE — XI DRAPE ARM

## (undated) DEVICE — ELCTR BOVIE TIP BLADE INSULATED 4" EDGE

## (undated) DEVICE — PREP SCRUB BRUSH W CHG 4%

## (undated) DEVICE — ELCTR ZOLL DEFIBRILLATOR PAD NO REPLACEMENT

## (undated) DEVICE — SUT STAINLESS STEEL 7 4-18" CCS

## (undated) DEVICE — LUBRICANT INST ELECTROLUBE Z SOLUTION

## (undated) DEVICE — BLOWER MISTER AXIUS WITH IV SET

## (undated) DEVICE — SUT VICRYL 1 36" CTX UNDYED

## (undated) DEVICE — SUT PROLENE 6-0 30" RB-2

## (undated) DEVICE — PACK PROC CV DRAPE

## (undated) DEVICE — TUBING STRYKER PNEUMOCLEAR HIGH FLOW HEATED

## (undated) DEVICE — SUT VICRYL 0 27" CT-3

## (undated) DEVICE — DRAPE TOWEL WHITE 17" X 24"

## (undated) DEVICE — SOL ANTI FOG

## (undated) DEVICE — TUBING BRAT 2 SUCTION ASSEMBLY TWIST LOCK

## (undated) DEVICE — TROCAR COVIDIEN VERSAPORT BLADELESS OPTICAL 5MM STANDARD

## (undated) DEVICE — MEDTRONIC URCHIN EVO HEART POSITIONER & CANISTER TUBING SET